# Patient Record
Sex: FEMALE | URBAN - METROPOLITAN AREA
[De-identification: names, ages, dates, MRNs, and addresses within clinical notes are randomized per-mention and may not be internally consistent; named-entity substitution may affect disease eponyms.]

---

## 2017-01-31 ENCOUNTER — HISTORICAL (OUTPATIENT)
Dept: ENDOSCOPY | Facility: HOSPITAL | Age: 67
End: 2017-01-31

## 2017-07-21 ENCOUNTER — HISTORICAL (OUTPATIENT)
Dept: ADMINISTRATIVE | Facility: HOSPITAL | Age: 67
End: 2017-07-21

## 2017-07-22 LAB — GRAM STN SPEC: NORMAL

## 2018-01-22 ENCOUNTER — HISTORICAL (OUTPATIENT)
Dept: LAB | Facility: HOSPITAL | Age: 68
End: 2018-01-22

## 2018-06-01 ENCOUNTER — HISTORICAL (OUTPATIENT)
Dept: LAB | Facility: HOSPITAL | Age: 68
End: 2018-06-01

## 2018-06-01 LAB
ALBUMIN SERPL-MCNC: 2.9 GM/DL (ref 3.4–5)
ALP SERPL-CCNC: 164 UNIT/L (ref 46–116)
ALT SERPL-CCNC: 31 UNIT/L (ref 12–78)
AST SERPL-CCNC: 31 UNIT/L (ref 15–37)
BILIRUB SERPL-MCNC: 0.8 MG/DL (ref 0.2–1)
BILIRUBIN DIRECT+TOT PNL SERPL-MCNC: 0.29 MG/DL (ref 0–0.2)
BILIRUBIN DIRECT+TOT PNL SERPL-MCNC: 0.51 MG/DL (ref 0–0.8)
BUN SERPL-MCNC: 38.4 MG/DL (ref 7–18)
CALCIUM SERPL-MCNC: 8.7 MG/DL (ref 8.5–10.1)
CHLORIDE SERPL-SCNC: 98 MMOL/L (ref 98–107)
CHOLEST SERPL-MCNC: 88 MG/DL (ref 0–200)
CHOLEST/HDLC SERPL: 1.9 {RATIO} (ref 0–4)
CO2 SERPL-SCNC: >45 MMOL/L (ref 21–32)
CREAT SERPL-MCNC: 1.19 MG/DL (ref 0.6–1.3)
CREAT/UREA NIT SERPL: 32
ERYTHROCYTE [DISTWIDTH] IN BLOOD BY AUTOMATED COUNT: 18.5 % (ref 11.5–17)
EST. AVERAGE GLUCOSE BLD GHB EST-MCNC: 140 MG/DL
FT4I SERPL CALC-MCNC: 3.81
GLUCOSE SERPL-MCNC: 200 MG/DL (ref 74–106)
HBA1C MFR BLD: 6.5 % (ref 4.5–6.2)
HCT VFR BLD AUTO: 24.7 % (ref 37–47)
HDLC SERPL-MCNC: 46 MG/DL (ref 40–60)
HGB BLD-MCNC: 7.4 GM/DL (ref 12–16)
LDLC SERPL CALC-MCNC: 33 MG/DL (ref 0–129)
MCH RBC QN AUTO: 23.3 PG (ref 27–31)
MCHC RBC AUTO-ENTMCNC: 30.1 GM/DL (ref 33–36)
MCV RBC AUTO: 77.5 FL (ref 80–94)
PLATELET # BLD AUTO: 207 X10(3)/MCL (ref 130–400)
PMV BLD AUTO: 7.6 FL (ref 7.4–10.4)
POTASSIUM SERPL-SCNC: 4.9 MMOL/L (ref 3.5–5.1)
PROT SERPL-MCNC: 7.7 GM/DL (ref 6.4–8.2)
RBC # BLD AUTO: 3.19 X10(6)/MCL (ref 4.2–5.4)
SODIUM SERPL-SCNC: 144 MMOL/L (ref 136–145)
T3RU NFR SERPL: 37 % (ref 31–39)
T4 SERPL-MCNC: 10.3 MCG/DL (ref 4.7–13.3)
TRIGL SERPL-MCNC: 44 MG/DL
TSH SERPL-ACNC: 2.63 MIU/ML (ref 0.36–3.74)
VLDLC SERPL CALC-MCNC: 9 MG/DL
WBC # SPEC AUTO: 6.8 X10(3)/MCL (ref 4.5–11.5)

## 2019-04-20 ENCOUNTER — HISTORICAL (OUTPATIENT)
Dept: LAB | Facility: HOSPITAL | Age: 69
End: 2019-04-20

## 2019-04-20 LAB
ABS NEUT (OLG): 4.74 X10(3)/MCL (ref 2.1–9.2)
ALBUMIN SERPL-MCNC: 3.1 GM/DL (ref 3.4–5)
ALP SERPL-CCNC: 163 UNIT/L (ref 46–116)
ALT SERPL-CCNC: 24 UNIT/L (ref 12–78)
AST SERPL-CCNC: 21 UNIT/L (ref 15–37)
BASOPHILS # BLD AUTO: 0 X10(3)/MCL (ref 0–0.2)
BASOPHILS NFR BLD AUTO: 0 %
BILIRUB SERPL-MCNC: 0.7 MG/DL (ref 0.2–1)
BILIRUBIN DIRECT+TOT PNL SERPL-MCNC: 0.16 MG/DL (ref 0–0.2)
BILIRUBIN DIRECT+TOT PNL SERPL-MCNC: 0.54 MG/DL (ref 0–0.8)
BUN SERPL-MCNC: 27 MG/DL (ref 7–18)
CALCIUM SERPL-MCNC: 9.6 MG/DL (ref 8.5–10.1)
CHLORIDE SERPL-SCNC: 93 MMOL/L (ref 98–107)
CHOLEST SERPL-MCNC: 121 MG/DL (ref 0–200)
CHOLEST/HDLC SERPL: 2.8 {RATIO} (ref 0–4)
CO2 SERPL-SCNC: >45 MMOL/L (ref 21–32)
CREAT SERPL-MCNC: 0.86 MG/DL (ref 0.6–1.3)
CREAT/UREA NIT SERPL: 31
DEPRECATED CALCIDIOL+CALCIFEROL SERPL-MC: 72.49 NG/ML (ref 30–80)
EOSINOPHIL # BLD AUTO: 0.3 X10(3)/MCL (ref 0–0.9)
EOSINOPHIL NFR BLD AUTO: 3 %
ERYTHROCYTE [DISTWIDTH] IN BLOOD BY AUTOMATED COUNT: 13.8 % (ref 11.5–17)
EST. AVERAGE GLUCOSE BLD GHB EST-MCNC: 200 MG/DL
FT4I SERPL CALC-MCNC: 2.64
GLUCOSE SERPL-MCNC: 272 MG/DL (ref 74–106)
HBA1C MFR BLD: 8.6 % (ref 4.5–6.2)
HCT VFR BLD AUTO: 35.9 % (ref 37–47)
HDLC SERPL-MCNC: 44 MG/DL (ref 40–60)
HGB BLD-MCNC: 10.9 GM/DL (ref 12–16)
IMM GRANULOCYTES # BLD AUTO: 0.02 % (ref 0–0.02)
IMM GRANULOCYTES NFR BLD AUTO: 0.3 % (ref 0–0.43)
LDLC SERPL CALC-MCNC: 58 MG/DL (ref 0–129)
LYMPHOCYTES # BLD AUTO: 1.9 X10(3)/MCL (ref 0.6–4.6)
LYMPHOCYTES NFR BLD AUTO: 25 %
MCH RBC QN AUTO: 26 PG (ref 27–31)
MCHC RBC AUTO-ENTMCNC: 30.4 GM/DL (ref 33–36)
MCV RBC AUTO: 85.5 FL (ref 80–94)
MONOCYTES # BLD AUTO: 0.8 X10(3)/MCL (ref 0.1–1.3)
MONOCYTES NFR BLD AUTO: 10 %
NEUTROPHILS # BLD AUTO: 4.74 X10(3)/MCL (ref 1.4–7.9)
NEUTROPHILS NFR BLD AUTO: 61 %
PLATELET # BLD AUTO: 209 X10(3)/MCL (ref 130–400)
PMV BLD AUTO: 10.4 FL (ref 9.4–12.4)
POTASSIUM SERPL-SCNC: 4.4 MMOL/L (ref 3.5–5.1)
PROT SERPL-MCNC: 8.4 GM/DL (ref 6.4–8.2)
RBC # BLD AUTO: 4.2 X10(6)/MCL (ref 4.2–5.4)
SODIUM SERPL-SCNC: 137 MMOL/L (ref 136–145)
T3RU NFR SERPL: 33 % (ref 31–39)
T4 SERPL-MCNC: 8 MCG/DL (ref 4.7–13.3)
TRIGL SERPL-MCNC: 95 MG/DL
TSH SERPL-ACNC: 2.77 MIU/ML (ref 0.36–3.74)
VLDLC SERPL CALC-MCNC: 19 MG/DL
WBC # SPEC AUTO: 7.7 X10(3)/MCL (ref 4.5–11.5)

## 2019-07-31 ENCOUNTER — HISTORICAL (OUTPATIENT)
Dept: EMERGENCY MEDICINE | Facility: HOSPITAL | Age: 69
End: 2019-07-31

## 2019-07-31 LAB
ABS NEUT (OLG): 5.11 X10(3)/MCL (ref 2.1–9.2)
APPEARANCE, UA: CLEAR
BACTERIA SPEC CULT: NORMAL
BASOPHILS # BLD AUTO: 0 X10(3)/MCL (ref 0–0.2)
BASOPHILS NFR BLD AUTO: 0 %
BILIRUB UR QL STRIP: NEGATIVE
BNP BLD-MCNC: 2660 PG/ML (ref 0–125)
BUN SERPL-MCNC: 28.4 MG/DL (ref 7–18)
CALCIUM SERPL-MCNC: 9.4 MG/DL (ref 8.5–10.1)
CHLORIDE SERPL-SCNC: 99 MMOL/L (ref 98–107)
CO2 SERPL-SCNC: >45 MMOL/L (ref 21–32)
COLOR UR: YELLOW
CREAT SERPL-MCNC: 0.84 MG/DL (ref 0.6–1.3)
CREAT/UREA NIT SERPL: 34
EOSINOPHIL # BLD AUTO: 0.1 X10(3)/MCL (ref 0–0.9)
EOSINOPHIL NFR BLD AUTO: 2 %
ERYTHROCYTE [DISTWIDTH] IN BLOOD BY AUTOMATED COUNT: 14.2 % (ref 11.5–17)
GLUCOSE (UA): NEGATIVE
GLUCOSE SERPL-MCNC: 39 MG/DL (ref 74–106)
HCO3 UR-SCNC: 49.2 MMOL/L (ref 22–26)
HCO3 UR-SCNC: 49.2 MMOL/L (ref 22–26)
HCT VFR BLD AUTO: 35 % (ref 37–47)
HGB BLD-MCNC: 9.7 GM/DL (ref 12–16)
HGB UR QL STRIP: NEGATIVE
IMM GRANULOCYTES # BLD AUTO: 0.06 % (ref 0–0.02)
IMM GRANULOCYTES NFR BLD AUTO: 0.7 % (ref 0–0.43)
KETONES UR QL STRIP: NEGATIVE
LEUKOCYTE ESTERASE UR QL STRIP: NEGATIVE
LYMPHOCYTES # BLD AUTO: 2.3 X10(3)/MCL (ref 0.6–4.6)
LYMPHOCYTES NFR BLD AUTO: 27 %
MAGNESIUM SERPL-MCNC: 2 MG/DL (ref 1.8–2.4)
MCH RBC QN AUTO: 26.4 PG (ref 27–31)
MCHC RBC AUTO-ENTMCNC: 27.7 GM/DL (ref 33–36)
MCV RBC AUTO: 95.1 FL (ref 80–94)
MONOCYTES # BLD AUTO: 0.9 X10(3)/MCL (ref 0.1–1.3)
MONOCYTES NFR BLD AUTO: 11 %
NEUTROPHILS # BLD AUTO: 5.11 X10(3)/MCL (ref 1.4–7.9)
NEUTROPHILS NFR BLD AUTO: 60 %
NITRITE UR QL STRIP: NEGATIVE
PCO2 BLDA: 116.2 MMHG (ref 35–45)
PCO2 BLDA: 116.2 MMHG (ref 35–45)
PH SMN: 7.24 [PH] (ref 7.35–7.45)
PH SMN: 7.24 [PH] (ref 7.35–7.45)
PH UR STRIP: 5.5 [PH] (ref 5–9)
PLATELET # BLD AUTO: 222 X10(3)/MCL (ref 130–400)
PMV BLD AUTO: 9.6 FL (ref 9.4–12.4)
PO2 BLDA: 105 MMHG (ref 80–100)
POC ALLENS TEST: ABNORMAL
POC BE: 22 (ref -2–3)
POC BE: 22 MMOL/L (ref -2–3)
POC CO2: >50 MMOL/L (ref 22–27)
POC SAMPLESOURCE: ABNORMAL
POC SATURATED O2: 96 % (ref 96–97)
POC SATURATED O2: 96 % (ref 96–97)
POC SITE: ABNORMAL
POC TCO2: >50 MMOL/L (ref 24–29)
POC TREATMENT: ABNORMAL
POC TROPONIN: 0.03 NG/ML (ref 0–0.08)
POTASSIUM SERPL-SCNC: 4.5 MMOL/L (ref 3.5–5.1)
PROT UR QL STRIP: NEGATIVE
RBC # BLD AUTO: 3.68 X10(6)/MCL (ref 4.2–5.4)
RBC #/AREA URNS HPF: NORMAL /[HPF]
SODIUM SERPL-SCNC: 145 MMOL/L (ref 136–145)
SP GR UR STRIP: 1.01 (ref 1–1.03)
SQUAMOUS EPITHELIAL, UA: NORMAL
TSH SERPL-ACNC: 2.2 MIU/ML (ref 0.36–3.74)
TSH SERPL-ACNC: 2.27 MIU/ML (ref 0.36–3.74)
UROBILINOGEN UR STRIP-ACNC: 0.2
WBC # SPEC AUTO: 8.5 X10(3)/MCL (ref 4.5–11.5)
WBC #/AREA URNS HPF: NORMAL /HPF

## 2019-08-05 LAB
ALBUMIN SERPL-MCNC: 2.3 GM/DL (ref 3.4–5)
BUN SERPL-MCNC: 31.7 MG/DL (ref 7–18)
CALCIUM SERPL-MCNC: 8.3 MG/DL (ref 8.5–10.1)
CHLORIDE SERPL-SCNC: 100 MMOL/L (ref 98–107)
CO2 SERPL-SCNC: 39.6 MMOL/L (ref 21–32)
CREAT SERPL-MCNC: 1.14 MG/DL (ref 0.6–1.3)
CREAT/UREA NIT SERPL: 28
GLUCOSE SERPL-MCNC: 165 MG/DL (ref 74–106)
HCO3 UR-SCNC: 38.3 MMOL/L (ref 22–26)
HCO3 UR-SCNC: 38.3 MMOL/L (ref 22–26)
PCO2 BLDA: 58.5 MMHG (ref 35–45)
PCO2 BLDA: 58.5 MMHG (ref 35–45)
PH SMN: 7.42 [PH] (ref 7.35–7.45)
PH SMN: 7.42 [PH] (ref 7.35–7.45)
PO2 BLDA: 203 MMHG (ref 80–100)
PO2 BLDA: 203 MMHG (ref 80–100)
POC ALLENS TEST: POSITIVE
POC BE: 14 (ref -2–3)
POC BE: 14 MMOL/L (ref -2–3)
POC CO2: 40 MMOL/L (ref 22–27)
POC SAMPLESOURCE: ABNORMAL
POC SATURATED O2: 100 % (ref 96–97)
POC SATURATED O2: >99 % (ref 96–97)
POC SITE: ABNORMAL
POC TCO2: 40 MMOL/L (ref 24–29)
POC TREATMENT: ABNORMAL
POTASSIUM SERPL-SCNC: 4.7 MMOL/L (ref 3.5–5.1)
PREALB SERPL-MCNC: 14.6 MG/DL (ref 18–35.7)
SODIUM SERPL-SCNC: 141 MMOL/L (ref 136–145)

## 2019-08-06 LAB
BUN SERPL-MCNC: 38.1 MG/DL (ref 7–18)
CALCIUM SERPL-MCNC: 9.8 MG/DL (ref 8.5–10.1)
CHLORIDE SERPL-SCNC: 96 MMOL/L (ref 98–107)
CO2 SERPL-SCNC: 40 MMOL/L (ref 21–32)
CREAT SERPL-MCNC: 1.12 MG/DL (ref 0.6–1.3)
CREAT/UREA NIT SERPL: 34
GLUCOSE SERPL-MCNC: 182 MG/DL (ref 74–106)
POTASSIUM SERPL-SCNC: 4.6 MMOL/L (ref 3.5–5.1)
SODIUM SERPL-SCNC: 137 MMOL/L (ref 136–145)

## 2019-08-07 LAB
ABS NEUT (OLG): 4.65 X10(3)/MCL (ref 2.1–9.2)
BASOPHILS # BLD AUTO: 0 X10(3)/MCL (ref 0–0.2)
BASOPHILS NFR BLD AUTO: 0 %
BUN SERPL-MCNC: 43.6 MG/DL (ref 7–18)
CALCIUM SERPL-MCNC: 9.4 MG/DL (ref 8.5–10.1)
CHLORIDE SERPL-SCNC: 96 MMOL/L (ref 98–107)
CO2 SERPL-SCNC: 42.2 MMOL/L (ref 21–32)
CREAT SERPL-MCNC: 1.4 MG/DL (ref 0.6–1.3)
CREAT/UREA NIT SERPL: 31
EOSINOPHIL # BLD AUTO: 0.3 X10(3)/MCL (ref 0–0.9)
EOSINOPHIL NFR BLD AUTO: 4 %
ERYTHROCYTE [DISTWIDTH] IN BLOOD BY AUTOMATED COUNT: 15.2 % (ref 11.5–17)
GLUCOSE SERPL-MCNC: 194 MG/DL (ref 74–106)
HCO3 UR-SCNC: 40.7 MMOL/L (ref 22–26)
HCO3 UR-SCNC: 40.7 MMOL/L (ref 22–26)
HCT VFR BLD AUTO: 31.5 % (ref 37–47)
HGB BLD-MCNC: 9.5 GM/DL (ref 12–16)
IMM GRANULOCYTES # BLD AUTO: 0.04 % (ref 0–0.02)
IMM GRANULOCYTES NFR BLD AUTO: 0.5 % (ref 0–0.43)
LYMPHOCYTES # BLD AUTO: 1.6 X10(3)/MCL (ref 0.6–4.6)
LYMPHOCYTES NFR BLD AUTO: 20 %
MCH RBC QN AUTO: 26.9 PG (ref 27–31)
MCHC RBC AUTO-ENTMCNC: 30.2 GM/DL (ref 33–36)
MCV RBC AUTO: 89.2 FL (ref 80–94)
MONOCYTES # BLD AUTO: 1.2 X10(3)/MCL (ref 0.1–1.3)
MONOCYTES NFR BLD AUTO: 16 %
NEUTROPHILS # BLD AUTO: 4.65 X10(3)/MCL (ref 1.4–7.9)
NEUTROPHILS NFR BLD AUTO: 60 %
PCO2 BLDA: 67.3 MMHG (ref 35–45)
PCO2 BLDA: 67.3 MMHG (ref 35–45)
PH SMN: 7.39 [PH] (ref 7.35–7.45)
PH SMN: 7.39 [PH] (ref 7.35–7.45)
PLATELET # BLD AUTO: 244 X10(3)/MCL (ref 130–400)
PMV BLD AUTO: 9.5 FL (ref 9.4–12.4)
PO2 BLDA: 89 MMHG (ref 80–100)
PO2 BLDA: 89 MMHG (ref 80–100)
POC ALLENS TEST: ABNORMAL
POC BE: 16 MMOL/L (ref -2–3)
POC CO2: 43 MMOL/L (ref 22–27)
POC SAMPLESOURCE: ABNORMAL
POC SATURATED O2: 96 % (ref 96–97)
POC SATURATED O2: 96 % (ref 96–97)
POC SITE: ABNORMAL
POC TCO2: 43 MMOL/L (ref 24–29)
POC TREATMENT: ABNORMAL
POTASSIUM SERPL-SCNC: 4.6 MMOL/L (ref 3.5–5.1)
RBC # BLD AUTO: 3.53 X10(6)/MCL (ref 4.2–5.4)
SODIUM SERPL-SCNC: 139 MMOL/L (ref 136–145)
WBC # SPEC AUTO: 7.7 X10(3)/MCL (ref 4.5–11.5)

## 2019-08-08 LAB
ABS NEUT (OLG): 5.22 X10(3)/MCL (ref 2.1–9.2)
ALBUMIN SERPL-MCNC: 3.1 GM/DL (ref 3.4–5)
ALBUMIN/GLOB SERPL: 0.7 RATIO (ref 1.1–2)
ALP SERPL-CCNC: 145 UNIT/L (ref 46–116)
ALT SERPL-CCNC: 23 UNIT/L (ref 12–78)
AST SERPL-CCNC: 21 UNIT/L (ref 15–37)
BASOPHILS # BLD AUTO: 0 X10(3)/MCL (ref 0–0.2)
BASOPHILS NFR BLD AUTO: 0 %
BILIRUB SERPL-MCNC: 0.4 MG/DL (ref 0.2–1)
BILIRUBIN DIRECT+TOT PNL SERPL-MCNC: 0.11 MG/DL (ref 0–0.2)
BILIRUBIN DIRECT+TOT PNL SERPL-MCNC: 0.29 MG/DL (ref 0–0.8)
BUN SERPL-MCNC: 60 MG/DL (ref 7–18)
CALCIUM SERPL-MCNC: 9.6 MG/DL (ref 8.5–10.1)
CHLORIDE SERPL-SCNC: 93 MMOL/L (ref 98–107)
CO2 SERPL-SCNC: 42.4 MMOL/L (ref 21–32)
CREAT SERPL-MCNC: 1.55 MG/DL (ref 0.6–1.3)
EOSINOPHIL # BLD AUTO: 0.2 X10(3)/MCL (ref 0–0.9)
EOSINOPHIL NFR BLD AUTO: 3 %
ERYTHROCYTE [DISTWIDTH] IN BLOOD BY AUTOMATED COUNT: 15.3 % (ref 11.5–17)
GLOBULIN SER-MCNC: 4.4 GM/DL (ref 2.4–3.5)
GLUCOSE SERPL-MCNC: 202 MG/DL (ref 74–106)
HCO3 UR-SCNC: 40.9 MMOL/L (ref 22–26)
HCO3 UR-SCNC: 40.9 MMOL/L (ref 22–26)
HCT VFR BLD AUTO: 33.5 % (ref 37–47)
HGB BLD-MCNC: 10.1 GM/DL (ref 12–16)
IMM GRANULOCYTES # BLD AUTO: 0.08 % (ref 0–0.02)
IMM GRANULOCYTES NFR BLD AUTO: 0.8 % (ref 0–0.43)
LYMPHOCYTES # BLD AUTO: 2.5 X10(3)/MCL (ref 0.6–4.6)
LYMPHOCYTES NFR BLD AUTO: 26 %
MAGNESIUM SERPL-MCNC: 2 MG/DL (ref 1.8–2.4)
MCH RBC QN AUTO: 26.5 PG (ref 27–31)
MCHC RBC AUTO-ENTMCNC: 30.1 GM/DL (ref 33–36)
MCV RBC AUTO: 87.9 FL (ref 80–94)
MONOCYTES # BLD AUTO: 1.5 X10(3)/MCL (ref 0.1–1.3)
MONOCYTES NFR BLD AUTO: 16 %
NEUTROPHILS # BLD AUTO: 5.22 X10(3)/MCL (ref 1.4–7.9)
NEUTROPHILS NFR BLD AUTO: 54 %
PCO2 BLDA: 65.7 MMHG (ref 35–45)
PCO2 BLDA: 65.7 MMHG (ref 35–45)
PH SMN: 7.4 [PH] (ref 7.35–7.45)
PH SMN: 7.4 [PH] (ref 7.35–7.45)
PLATELET # BLD AUTO: 305 X10(3)/MCL (ref 130–400)
PMV BLD AUTO: 10.1 FL (ref 9.4–12.4)
PO2 BLDA: 133 MMHG (ref 80–100)
PO2 BLDA: 133 MMHG (ref 80–100)
POC ALLENS TEST: ABNORMAL
POC BE: 16 MMOL/L (ref -2–3)
POC CO2: 43 MMOL/L (ref 22–27)
POC SAMPLESOURCE: ABNORMAL
POC SATURATED O2: 99 % (ref 96–97)
POC SATURATED O2: 99 % (ref 96–97)
POC TCO2: 43 MMOL/L (ref 24–29)
POC TREATMENT: ABNORMAL
POTASSIUM SERPL-SCNC: 4.1 MMOL/L (ref 3.5–5.1)
PROT SERPL-MCNC: 7.5 GM/DL (ref 6.4–8.2)
RBC # BLD AUTO: 3.81 X10(6)/MCL (ref 4.2–5.4)
SODIUM SERPL-SCNC: 139 MMOL/L (ref 136–145)
T4 FREE SERPL-MCNC: 1.62 NG/DL (ref 0.76–1.46)
TOBRAMYCIN TROUGH SERPL-MCNC: 1.3 MCG/ML (ref 0–2)
TSH SERPL-ACNC: 1.19 MIU/ML (ref 0.36–3.74)
WBC # SPEC AUTO: 9.6 X10(3)/MCL (ref 4.5–11.5)

## 2019-08-09 LAB
HCO3 UR-SCNC: 40.9 MMOL/L (ref 22–26)
PCO2 BLDA: 65.7 MMHG (ref 35–45)
PH SMN: 7.4 [PH] (ref 7.35–7.45)
PO2 BLDA: 133 MMHG (ref 50–100)
POC SATURATED O2: 99 % (ref 96–97)

## 2019-08-11 ENCOUNTER — HISTORICAL (OUTPATIENT)
Dept: ADMINISTRATIVE | Facility: HOSPITAL | Age: 69
End: 2019-08-11

## 2019-08-11 LAB
ABS NEUT (OLG): 9.17 X10(3)/MCL (ref 2.1–9.2)
APPEARANCE, UA: CLEAR
BACTERIA SPEC CULT: ABNORMAL
BASOPHILS # BLD AUTO: 0 X10(3)/MCL (ref 0–0.2)
BASOPHILS NFR BLD AUTO: 0 %
BILIRUB UR QL STRIP: NEGATIVE
BUN SERPL-MCNC: 77.8 MG/DL (ref 7–18)
CALCIUM SERPL-MCNC: 9 MG/DL (ref 8.5–10.1)
CHLORIDE SERPL-SCNC: 92 MMOL/L (ref 98–107)
CO2 SERPL-SCNC: 39.2 MMOL/L (ref 21–32)
COLOR UR: YELLOW
CREAT SERPL-MCNC: 1.48 MG/DL (ref 0.6–1.3)
CREAT/UREA NIT SERPL: 53
EOSINOPHIL # BLD AUTO: 0.1 X10(3)/MCL (ref 0–0.9)
EOSINOPHIL NFR BLD AUTO: 1 %
ERYTHROCYTE [DISTWIDTH] IN BLOOD BY AUTOMATED COUNT: 14.9 % (ref 11.5–17)
GLUCOSE (UA): NEGATIVE
GLUCOSE SERPL-MCNC: 181 MG/DL (ref 74–106)
HCT VFR BLD AUTO: 36.1 % (ref 37–47)
HGB BLD-MCNC: 11 GM/DL (ref 12–16)
HGB UR QL STRIP: ABNORMAL
IMM GRANULOCYTES # BLD AUTO: 0.04 % (ref 0–0.02)
IMM GRANULOCYTES NFR BLD AUTO: 0.3 % (ref 0–0.43)
KETONES UR QL STRIP: NEGATIVE
LEUKOCYTE ESTERASE UR QL STRIP: NEGATIVE
LYMPHOCYTES # BLD AUTO: 1.9 X10(3)/MCL (ref 0.6–4.6)
LYMPHOCYTES NFR BLD AUTO: 14 %
MAGNESIUM SERPL-MCNC: 2.4 MG/DL (ref 1.8–2.4)
MCH RBC QN AUTO: 26.7 PG (ref 27–31)
MCHC RBC AUTO-ENTMCNC: 30.5 GM/DL (ref 33–36)
MCV RBC AUTO: 87.6 FL (ref 80–94)
MONOCYTES # BLD AUTO: 1.8 X10(3)/MCL (ref 0.1–1.3)
MONOCYTES NFR BLD AUTO: 14 %
NEUTROPHILS # BLD AUTO: 9.17 X10(3)/MCL (ref 1.4–7.9)
NEUTROPHILS NFR BLD AUTO: 71 %
NITRITE UR QL STRIP: NEGATIVE
PH UR STRIP: 6 [PH] (ref 5–9)
PLATELET # BLD AUTO: 332 X10(3)/MCL (ref 130–400)
PMV BLD AUTO: 9.8 FL (ref 9.4–12.4)
POTASSIUM SERPL-SCNC: 3.6 MMOL/L (ref 3.5–5.1)
PROT UR QL STRIP: ABNORMAL
RBC # BLD AUTO: 4.12 X10(6)/MCL (ref 4.2–5.4)
RBC #/AREA URNS HPF: ABNORMAL /[HPF]
SODIUM SERPL-SCNC: 138 MMOL/L (ref 136–145)
SP GR UR STRIP: 1.01 (ref 1–1.03)
SQUAMOUS EPITHELIAL, UA: ABNORMAL
UROBILINOGEN UR STRIP-ACNC: 0.2
WBC # SPEC AUTO: 13 X10(3)/MCL (ref 4.5–11.5)
WBC #/AREA URNS HPF: ABNORMAL /[HPF]

## 2019-08-12 LAB
ABS NEUT (OLG): 6.25 X10(3)/MCL (ref 2.1–9.2)
ALBUMIN SERPL-MCNC: 3 GM/DL (ref 3.4–5)
BASOPHILS # BLD AUTO: 0 X10(3)/MCL (ref 0–0.2)
BASOPHILS NFR BLD AUTO: 0 %
BNP BLD-MCNC: 3099 PG/ML (ref 0–125)
BUN SERPL-MCNC: 71.5 MG/DL (ref 7–18)
CALCIUM SERPL-MCNC: 8.9 MG/DL (ref 8.5–10.1)
CHLORIDE SERPL-SCNC: 98 MMOL/L (ref 98–107)
CO2 SERPL-SCNC: 38.2 MMOL/L (ref 21–32)
COLOR STL: ABNORMAL
CONSISTENCY STL: ABNORMAL
CREAT SERPL-MCNC: 1.14 MG/DL (ref 0.6–1.3)
EOSINOPHIL # BLD AUTO: 0.2 X10(3)/MCL (ref 0–0.9)
EOSINOPHIL NFR BLD AUTO: 2 %
ERYTHROCYTE [DISTWIDTH] IN BLOOD BY AUTOMATED COUNT: 15.3 % (ref 11.5–17)
GLUCOSE SERPL-MCNC: 135 MG/DL (ref 74–106)
HCT VFR BLD AUTO: 34.6 % (ref 37–47)
HEMOCCULT SP1 STL QL: POSITIVE
HGB BLD-MCNC: 10.4 GM/DL (ref 12–16)
IMM GRANULOCYTES # BLD AUTO: 0.02 % (ref 0–0.02)
IMM GRANULOCYTES NFR BLD AUTO: 0.2 % (ref 0–0.43)
LYMPHOCYTES # BLD AUTO: 1.9 X10(3)/MCL (ref 0.6–4.6)
LYMPHOCYTES NFR BLD AUTO: 19 %
MAGNESIUM SERPL-MCNC: 2.4 MG/DL (ref 1.8–2.4)
MCH RBC QN AUTO: 26.7 PG (ref 27–31)
MCHC RBC AUTO-ENTMCNC: 30.1 GM/DL (ref 33–36)
MCV RBC AUTO: 88.7 FL (ref 80–94)
MONOCYTES # BLD AUTO: 1.4 X10(3)/MCL (ref 0.1–1.3)
MONOCYTES NFR BLD AUTO: 14 %
NEUTROPHILS # BLD AUTO: 6.25 X10(3)/MCL (ref 1.4–7.9)
NEUTROPHILS NFR BLD AUTO: 64 %
PHOSPHATE SERPL-MCNC: 3.9 MG/DL (ref 2.5–4.9)
PHOSPHATE SERPL-MCNC: 3.9 MG/DL (ref 2.5–4.9)
PLATELET # BLD AUTO: 294 X10(3)/MCL (ref 130–400)
PMV BLD AUTO: 10.1 FL (ref 9.4–12.4)
POTASSIUM SERPL-SCNC: 3.7 MMOL/L (ref 3.5–5.1)
RBC # BLD AUTO: 3.9 X10(6)/MCL (ref 4.2–5.4)
SODIUM SERPL-SCNC: 140 MMOL/L (ref 136–145)
TOBRAMYCIN TROUGH SERPL-MCNC: 0.9 MCG/ML (ref 0–2)
WBC # SPEC AUTO: 9.7 X10(3)/MCL (ref 4.5–11.5)

## 2019-08-13 LAB
ABS NEUT (OLG): 4.38 X10(3)/MCL (ref 2.1–9.2)
BASOPHILS # BLD AUTO: 0 X10(3)/MCL (ref 0–0.2)
BASOPHILS NFR BLD AUTO: 0 %
BUN SERPL-MCNC: 65.4 MG/DL (ref 7–18)
CALCIUM SERPL-MCNC: 9 MG/DL (ref 8.5–10.1)
CHLORIDE SERPL-SCNC: 98 MMOL/L (ref 98–107)
CO2 SERPL-SCNC: 31.2 MMOL/L (ref 21–32)
CREAT SERPL-MCNC: 1.26 MG/DL (ref 0.6–1.3)
CREAT/UREA NIT SERPL: 52
EOSINOPHIL # BLD AUTO: 0.2 X10(3)/MCL (ref 0–0.9)
EOSINOPHIL NFR BLD AUTO: 2 %
ERYTHROCYTE [DISTWIDTH] IN BLOOD BY AUTOMATED COUNT: 15.4 % (ref 11.5–17)
GLUCOSE SERPL-MCNC: 183 MG/DL (ref 74–106)
HCT VFR BLD AUTO: 31.3 % (ref 37–47)
HGB BLD-MCNC: 9.7 GM/DL (ref 12–16)
IMM GRANULOCYTES # BLD AUTO: 0.02 % (ref 0–0.02)
IMM GRANULOCYTES NFR BLD AUTO: 0.3 % (ref 0–0.43)
LYMPHOCYTES # BLD AUTO: 2.1 X10(3)/MCL (ref 0.6–4.6)
LYMPHOCYTES NFR BLD AUTO: 27 %
MCH RBC QN AUTO: 26.4 PG (ref 27–31)
MCHC RBC AUTO-ENTMCNC: 31 GM/DL (ref 33–36)
MCV RBC AUTO: 85.3 FL (ref 80–94)
MONOCYTES # BLD AUTO: 1.2 X10(3)/MCL (ref 0.1–1.3)
MONOCYTES NFR BLD AUTO: 15 %
NEUTROPHILS # BLD AUTO: 4.38 X10(3)/MCL (ref 1.4–7.9)
NEUTROPHILS NFR BLD AUTO: 56 %
PLATELET # BLD AUTO: 276 X10(3)/MCL (ref 130–400)
PMV BLD AUTO: 10.1 FL (ref 9.4–12.4)
POTASSIUM SERPL-SCNC: 4 MMOL/L (ref 3.5–5.1)
RBC # BLD AUTO: 3.67 X10(6)/MCL (ref 4.2–5.4)
SODIUM SERPL-SCNC: 140 MMOL/L (ref 136–145)
WBC # SPEC AUTO: 7.8 X10(3)/MCL (ref 4.5–11.5)

## 2019-08-14 LAB
FINAL CULTURE: NO GROWTH
TOBRAMYCIN TROUGH SERPL-MCNC: 0.4 MCG/ML (ref 0–2)

## 2019-08-16 LAB
BUN SERPL-MCNC: 43 MG/DL (ref 7–18)
CALCIUM SERPL-MCNC: 8.9 MG/DL (ref 8.5–10.1)
CHLORIDE SERPL-SCNC: 104 MMOL/L (ref 98–107)
CO2 SERPL-SCNC: 28.8 MMOL/L (ref 21–32)
CREAT SERPL-MCNC: 1.06 MG/DL (ref 0.6–1.3)
CREAT/UREA NIT SERPL: 41
GLUCOSE SERPL-MCNC: 144 MG/DL (ref 74–106)
POTASSIUM SERPL-SCNC: 5 MMOL/L (ref 3.5–5.1)
SODIUM SERPL-SCNC: 138 MMOL/L (ref 136–145)

## 2019-08-17 LAB
COLOR STL: NORMAL
CONSISTENCY STL: NORMAL
FERRITIN SERPL-MCNC: 266 NG/ML (ref 8–388)
HEMOCCULT SP2 STL QL: NEGATIVE
IRON SATN MFR SERPL: 31.8 % (ref 20–50)
IRON SERPL-MCNC: 57 MCG/DL (ref 50–175)
TIBC SERPL-MCNC: 179 MCG/DL (ref 250–450)
TRANSFERRIN SERPL-MCNC: 149 MG/DL (ref 200–360)

## 2019-08-19 LAB
BUN SERPL-MCNC: 46.5 MG/DL (ref 7–18)
CALCIUM SERPL-MCNC: 8.7 MG/DL (ref 8.5–10.1)
CHLORIDE SERPL-SCNC: 106 MMOL/L (ref 98–107)
CO2 SERPL-SCNC: 27.9 MMOL/L (ref 21–32)
COLOR STL: NORMAL
CONSISTENCY STL: NORMAL
CREAT SERPL-MCNC: 1.08 MG/DL (ref 0.6–1.3)
CREAT/UREA NIT SERPL: 43
GLUCOSE SERPL-MCNC: 109 MG/DL (ref 74–106)
POTASSIUM SERPL-SCNC: 5.1 MMOL/L (ref 3.5–5.1)
SODIUM SERPL-SCNC: 142 MMOL/L (ref 136–145)

## 2019-08-22 LAB
ABS NEUT (OLG): 7.47 X10(3)/MCL (ref 2.1–9.2)
BASOPHILS # BLD AUTO: 0 X10(3)/MCL (ref 0–0.2)
BASOPHILS NFR BLD AUTO: 0 %
BUN SERPL-MCNC: 31.7 MG/DL (ref 7–18)
CALCIUM SERPL-MCNC: 8.5 MG/DL (ref 8.5–10.1)
CHLORIDE SERPL-SCNC: 104 MMOL/L (ref 98–107)
CO2 SERPL-SCNC: 26.2 MMOL/L (ref 21–32)
CREAT SERPL-MCNC: 0.97 MG/DL (ref 0.6–1.3)
CREAT/UREA NIT SERPL: 33
EOSINOPHIL # BLD AUTO: 0.1 X10(3)/MCL (ref 0–0.9)
EOSINOPHIL NFR BLD AUTO: 1 %
ERYTHROCYTE [DISTWIDTH] IN BLOOD BY AUTOMATED COUNT: 16 % (ref 11.5–17)
GLUCOSE SERPL-MCNC: 124 MG/DL (ref 74–106)
HCT VFR BLD AUTO: 29.8 % (ref 37–47)
HGB BLD-MCNC: 9.2 GM/DL (ref 12–16)
IMM GRANULOCYTES # BLD AUTO: 0.03 % (ref 0–0.02)
IMM GRANULOCYTES NFR BLD AUTO: 0.3 % (ref 0–0.43)
LYMPHOCYTES # BLD AUTO: 1.6 X10(3)/MCL (ref 0.6–4.6)
LYMPHOCYTES NFR BLD AUTO: 16 %
MCH RBC QN AUTO: 27.1 PG (ref 27–31)
MCHC RBC AUTO-ENTMCNC: 30.9 GM/DL (ref 33–36)
MCV RBC AUTO: 87.9 FL (ref 80–94)
MONOCYTES # BLD AUTO: 0.8 X10(3)/MCL (ref 0.1–1.3)
MONOCYTES NFR BLD AUTO: 8 %
NEUTROPHILS # BLD AUTO: 7.47 X10(3)/MCL (ref 1.4–7.9)
NEUTROPHILS NFR BLD AUTO: 75 %
PLATELET # BLD AUTO: 219 X10(3)/MCL (ref 130–400)
PMV BLD AUTO: 10 FL (ref 9.4–12.4)
POTASSIUM SERPL-SCNC: 4.8 MMOL/L (ref 3.5–5.1)
RBC # BLD AUTO: 3.39 X10(6)/MCL (ref 4.2–5.4)
SODIUM SERPL-SCNC: 138 MMOL/L (ref 136–145)
WBC # SPEC AUTO: 9.9 X10(3)/MCL (ref 4.5–11.5)

## 2019-09-13 LAB — IRON SERPL-MCNC: 21 MCG/DL (ref 50–175)

## 2019-09-17 LAB
ABS NEUT (OLG): 3.8 X10(3)/MCL (ref 2.1–9.2)
ALBUMIN SERPL-MCNC: 2.1 GM/DL (ref 3.4–5)
ALBUMIN/GLOB SERPL: 0.6 RATIO (ref 1.1–2)
ALP SERPL-CCNC: 78 UNIT/L (ref 46–116)
ALT SERPL-CCNC: 58 UNIT/L (ref 12–78)
AST SERPL-CCNC: 45 UNIT/L (ref 15–37)
BASOPHILS # BLD AUTO: 0 X10(3)/MCL (ref 0–0.2)
BASOPHILS NFR BLD AUTO: 0 %
BILIRUB SERPL-MCNC: 0.2 MG/DL (ref 0.2–1)
BILIRUBIN DIRECT+TOT PNL SERPL-MCNC: 0.07 MG/DL (ref 0–0.8)
BILIRUBIN DIRECT+TOT PNL SERPL-MCNC: 0.13 MG/DL (ref 0–0.2)
BUN SERPL-MCNC: 59 MG/DL (ref 7–18)
CALCIUM SERPL-MCNC: 8 MG/DL (ref 8.5–10.1)
CHLORIDE SERPL-SCNC: 101 MMOL/L (ref 98–107)
CO2 SERPL-SCNC: 29.7 MMOL/L (ref 21–32)
CREAT SERPL-MCNC: 0.99 MG/DL (ref 0.6–1.3)
EOSINOPHIL # BLD AUTO: 0.1 X10(3)/MCL (ref 0–0.9)
EOSINOPHIL NFR BLD AUTO: 2 %
ERYTHROCYTE [DISTWIDTH] IN BLOOD BY AUTOMATED COUNT: 15.9 % (ref 11.5–17)
GLOBULIN SER-MCNC: 3.3 GM/DL (ref 2.4–3.5)
GLUCOSE SERPL-MCNC: 220 MG/DL (ref 74–106)
HCT VFR BLD AUTO: 28.1 % (ref 37–47)
HGB BLD-MCNC: 8.5 GM/DL (ref 12–16)
IMM GRANULOCYTES # BLD AUTO: 0.26 % (ref 0–0.02)
IMM GRANULOCYTES NFR BLD AUTO: 4.7 % (ref 0–0.43)
LYMPHOCYTES # BLD AUTO: 0.6 X10(3)/MCL (ref 0.6–4.6)
LYMPHOCYTES NFR BLD AUTO: 12 %
MCH RBC QN AUTO: 27.2 PG (ref 27–31)
MCHC RBC AUTO-ENTMCNC: 30.2 GM/DL (ref 33–36)
MCV RBC AUTO: 90.1 FL (ref 80–94)
MONOCYTES # BLD AUTO: 0.7 X10(3)/MCL (ref 0.1–1.3)
MONOCYTES NFR BLD AUTO: 13 %
NEUTROPHILS # BLD AUTO: 3.8 X10(3)/MCL (ref 1.4–7.9)
NEUTROPHILS NFR BLD AUTO: 68 %
PLATELET # BLD AUTO: 226 X10(3)/MCL (ref 130–400)
PMV BLD AUTO: 9.7 FL (ref 9.4–12.4)
POTASSIUM SERPL-SCNC: 4.1 MMOL/L (ref 3.5–5.1)
PROT SERPL-MCNC: 5.4 GM/DL (ref 6.4–8.2)
RBC # BLD AUTO: 3.12 X10(6)/MCL (ref 4.2–5.4)
SODIUM SERPL-SCNC: 136 MMOL/L (ref 136–145)
WBC # SPEC AUTO: 5.6 X10(3)/MCL (ref 4.5–11.5)

## 2019-09-18 LAB
ABS NEUT (OLG): 3.16 X10(3)/MCL (ref 2.1–9.2)
BUN SERPL-MCNC: 56 MG/DL (ref 7–18)
CALCIUM SERPL-MCNC: 7.8 MG/DL (ref 8.5–10.1)
CHLORIDE SERPL-SCNC: 103 MMOL/L (ref 98–107)
CO2 SERPL-SCNC: 30.9 MMOL/L (ref 21–32)
CREAT SERPL-MCNC: 1.07 MG/DL (ref 0.6–1.3)
CREAT/UREA NIT SERPL: 52
EOSINOPHIL NFR BLD MANUAL: 4 % (ref 0–8)
ERYTHROCYTE [DISTWIDTH] IN BLOOD BY AUTOMATED COUNT: 16.2 % (ref 11.5–17)
GLUCOSE SERPL-MCNC: 191 MG/DL (ref 74–106)
HCT VFR BLD AUTO: 27 % (ref 37–47)
HGB BLD-MCNC: 8.1 GM/DL (ref 12–16)
LYMPHOCYTES NFR BLD MANUAL: 19 % (ref 13–40)
MCH RBC QN AUTO: 27.4 PG (ref 27–31)
MCHC RBC AUTO-ENTMCNC: 30 GM/DL (ref 33–36)
MCV RBC AUTO: 91.2 FL (ref 80–94)
MONOCYTES NFR BLD MANUAL: 14 % (ref 2–11)
NEUTROPHILS NFR BLD MANUAL: 63 % (ref 47–80)
PLATELET # BLD AUTO: 212 X10(3)/MCL (ref 130–400)
PLATELET # BLD EST: NORMAL 10*3/UL
PMV BLD AUTO: 10 FL (ref 9.4–12.4)
POTASSIUM SERPL-SCNC: 4.3 MMOL/L (ref 3.5–5.1)
RBC # BLD AUTO: 2.96 X10(6)/MCL (ref 4.2–5.4)
RBC MORPH BLD: NORMAL
SODIUM SERPL-SCNC: 137 MMOL/L (ref 136–145)
WBC # SPEC AUTO: 5.2 X10(3)/MCL (ref 4.5–11.5)

## 2019-09-19 ENCOUNTER — HISTORICAL (OUTPATIENT)
Dept: ADMINISTRATIVE | Facility: HOSPITAL | Age: 69
End: 2019-09-19

## 2019-09-19 LAB
ABS NEUT (OLG): 4.56 X10(3)/MCL (ref 2.1–9.2)
AMMONIA PLAS-MSCNC: 30.2 MCG/DL (ref 14–44.8)
BASOPHILS # BLD AUTO: 0 X10(3)/MCL (ref 0–0.2)
BASOPHILS NFR BLD AUTO: 0 %
EOSINOPHIL # BLD AUTO: 0.1 X10(3)/MCL (ref 0–0.9)
EOSINOPHIL NFR BLD AUTO: 1 %
ERYTHROCYTE [DISTWIDTH] IN BLOOD BY AUTOMATED COUNT: 16.6 % (ref 11.5–17)
HCO3 UR-SCNC: 26.7 MMOL/L (ref 22–26)
HCO3 UR-SCNC: 26.7 MMOL/L (ref 22–26)
HCT VFR BLD AUTO: 27.5 % (ref 37–47)
HGB BLD-MCNC: 8.3 GM/DL (ref 12–16)
LYMPHOCYTES # BLD AUTO: 0.9 X10(3)/MCL (ref 0.6–4.6)
LYMPHOCYTES NFR BLD AUTO: 14 %
MCH RBC QN AUTO: 27.5 PG (ref 27–31)
MCHC RBC AUTO-ENTMCNC: 30.2 GM/DL (ref 33–36)
MCV RBC AUTO: 91.1 FL (ref 80–94)
MONOCYTES # BLD AUTO: 0.7 X10(3)/MCL (ref 0.1–1.3)
MONOCYTES NFR BLD AUTO: 11 %
NEUTROPHILS # BLD AUTO: 4.56 X10(3)/MCL (ref 1.4–7.9)
NEUTROPHILS NFR BLD AUTO: 72 %
PCO2 BLDA: 67.2 MMHG (ref 35–45)
PCO2 BLDA: 67.2 MMHG (ref 35–45)
PH SMN: 7.21 [PH] (ref 7.35–7.45)
PH SMN: 7.21 [PH] (ref 7.35–7.45)
PLATELET # BLD AUTO: 226 X10(3)/MCL (ref 130–400)
PMV BLD AUTO: 10.3 FL (ref 9.4–12.4)
PO2 BLDA: 117 MMHG (ref 80–100)
PO2 BLDA: 117 MMHG (ref 80–100)
POC ALLENS TEST: POSITIVE
POC BE: -1 MMOL/L (ref -2–3)
POC BE: 1 (ref -2–3)
POC CO2: 29 MMOL/L (ref 22–27)
POC SAMPLESOURCE: ABNORMAL
POC SATURATED O2: 97 % (ref 96–97)
POC SATURATED O2: 97 % (ref 96–97)
POC SITE: ABNORMAL
POC TCO2: 29 MMOL/L (ref 24–29)
POC TREATMENT: ABNORMAL
RBC # BLD AUTO: 3.02 X10(6)/MCL (ref 4.2–5.4)
WBC # SPEC AUTO: 6.4 X10(3)/MCL (ref 4.5–11.5)

## 2019-09-20 LAB
% SATURATION: 30 % (ref 68–77)
BUN SERPL-MCNC: 54.4 MG/DL (ref 7–18)
CALCIUM SERPL-MCNC: 8.3 MG/DL (ref 8.5–10.1)
CHLORIDE SERPL-SCNC: 109 MMOL/L (ref 98–107)
CO2 SERPL-SCNC: 29 MMOL/L (ref 21–32)
CREAT SERPL-MCNC: 1.1 MG/DL (ref 0.6–1.3)
CREAT/UREA NIT SERPL: 49
D BASE VENOUS: 1 (ref -2–3)
GLUCOSE SERPL-MCNC: 137 MG/DL (ref 74–106)
HCO3 UR-SCNC: 26.8 MMOL/L (ref 22–26)
HCO3 VENOUS: 26.8 MEQ/L (ref 22–29)
PCO2 BLDA: 49.4 MMHG (ref 35–45)
PCO2 VENOUS: 49.4 MMHG (ref 42–51)
PH SMN: 7.34 [PH] (ref 7.35–7.45)
PH VENOUS: 7.34 (ref 7.32–7.43)
PO2 BLDA: 21 MMHG (ref 80–100)
PO2 VENOUS: 21 MMHG (ref 35–45)
POC BE: 1 MMOL/L (ref -2–3)
POC SATURATED O2: 30 % (ref 96–97)
POC TCO2: 28 MMOL/L (ref 24–29)
POTASSIUM SERPL-SCNC: 4.3 MMOL/L (ref 3.5–5.1)
SAMPLE VEN: ABNORMAL
SITE VEN: ABNORMAL
SODIUM SERPL-SCNC: 144 MMOL/L (ref 136–145)
TREATMENT VEN: ABNORMAL

## 2019-09-24 LAB
ABS NEUT (OLG): 5.88 X10(3)/MCL (ref 2.1–9.2)
BASOPHILS # BLD AUTO: 0 X10(3)/MCL (ref 0–0.2)
BASOPHILS NFR BLD AUTO: 0 %
BUN SERPL-MCNC: 41.6 MG/DL (ref 7–18)
CALCIUM SERPL-MCNC: 8.1 MG/DL (ref 8.5–10.1)
CHLORIDE SERPL-SCNC: 112 MMOL/L (ref 98–107)
CO2 SERPL-SCNC: 24.5 MMOL/L (ref 21–32)
CREAT SERPL-MCNC: 1.13 MG/DL (ref 0.6–1.3)
CREAT/UREA NIT SERPL: 37
EOSINOPHIL # BLD AUTO: 0.1 X10(3)/MCL (ref 0–0.9)
EOSINOPHIL NFR BLD AUTO: 1 %
ERYTHROCYTE [DISTWIDTH] IN BLOOD BY AUTOMATED COUNT: 17.2 % (ref 11.5–17)
GLUCOSE SERPL-MCNC: 185 MG/DL (ref 74–106)
GROUP & RH: NORMAL
HCT VFR BLD AUTO: 24 % (ref 37–47)
HGB BLD-MCNC: 7.1 GM/DL (ref 12–16)
IMM GRANULOCYTES # BLD AUTO: 0.02 % (ref 0–0.02)
IMM GRANULOCYTES NFR BLD AUTO: 0.3 % (ref 0–0.43)
LYMPHOCYTES # BLD AUTO: 0.7 X10(3)/MCL (ref 0.6–4.6)
LYMPHOCYTES NFR BLD AUTO: 10 %
MCH RBC QN AUTO: 27.6 PG (ref 27–31)
MCHC RBC AUTO-ENTMCNC: 29.6 GM/DL (ref 33–36)
MCV RBC AUTO: 93.4 FL (ref 80–94)
MONOCYTES # BLD AUTO: 0.5 X10(3)/MCL (ref 0.1–1.3)
MONOCYTES NFR BLD AUTO: 7 %
NEUTROPHILS # BLD AUTO: 5.88 X10(3)/MCL (ref 1.4–7.9)
NEUTROPHILS NFR BLD AUTO: 82 %
PLATELET # BLD AUTO: 334 X10(3)/MCL (ref 130–400)
PMV BLD AUTO: 10 FL (ref 9.4–12.4)
POTASSIUM SERPL-SCNC: 4.2 MMOL/L (ref 3.5–5.1)
PRODUCT READY: NORMAL
RBC # BLD AUTO: 2.57 X10(6)/MCL (ref 4.2–5.4)
SODIUM SERPL-SCNC: 144 MMOL/L (ref 136–145)
TRANSFUSION ORDER: NORMAL
WBC # SPEC AUTO: 7.2 X10(3)/MCL (ref 4.5–11.5)

## 2019-09-25 LAB
ABS NEUT (OLG): 6.37 X10(3)/MCL (ref 2.1–9.2)
ALBUMIN SERPL-MCNC: 2.1 GM/DL (ref 3.4–5)
BASOPHILS # BLD AUTO: 0 X10(3)/MCL (ref 0–0.2)
BASOPHILS NFR BLD AUTO: 0 %
BUN SERPL-MCNC: 37.5 MG/DL (ref 7–18)
CALCIUM SERPL-MCNC: 8.2 MG/DL (ref 8.5–10.1)
CHLORIDE SERPL-SCNC: 110 MMOL/L (ref 98–107)
CO2 SERPL-SCNC: 25.3 MMOL/L (ref 21–32)
CREAT SERPL-MCNC: 1.06 MG/DL (ref 0.6–1.3)
EOSINOPHIL # BLD AUTO: 0.3 X10(3)/MCL (ref 0–0.9)
EOSINOPHIL NFR BLD AUTO: 3 %
ERYTHROCYTE [DISTWIDTH] IN BLOOD BY AUTOMATED COUNT: 16.6 % (ref 11.5–17)
GLUCOSE SERPL-MCNC: 170 MG/DL (ref 74–106)
HCT VFR BLD AUTO: 33.9 % (ref 37–47)
HGB BLD-MCNC: 10.5 GM/DL (ref 12–16)
IMM GRANULOCYTES # BLD AUTO: 0.04 % (ref 0–0.02)
IMM GRANULOCYTES NFR BLD AUTO: 0.5 % (ref 0–0.43)
LYMPHOCYTES # BLD AUTO: 0.7 X10(3)/MCL (ref 0.6–4.6)
LYMPHOCYTES NFR BLD AUTO: 9 %
MCH RBC QN AUTO: 28.6 PG (ref 27–31)
MCHC RBC AUTO-ENTMCNC: 31 GM/DL (ref 33–36)
MCV RBC AUTO: 92.4 FL (ref 80–94)
MONOCYTES # BLD AUTO: 0.6 X10(3)/MCL (ref 0.1–1.3)
MONOCYTES NFR BLD AUTO: 8 %
NEUTROPHILS # BLD AUTO: 6.37 X10(3)/MCL (ref 1.4–7.9)
NEUTROPHILS NFR BLD AUTO: 79 %
PHOSPHATE SERPL-MCNC: 4.3 MG/DL (ref 2.5–4.9)
PLATELET # BLD AUTO: 352 X10(3)/MCL (ref 130–400)
PMV BLD AUTO: 10.2 FL (ref 9.4–12.4)
POTASSIUM SERPL-SCNC: 3.9 MMOL/L (ref 3.5–5.1)
RBC # BLD AUTO: 3.67 X10(6)/MCL (ref 4.2–5.4)
SODIUM SERPL-SCNC: 143 MMOL/L (ref 136–145)
WBC # SPEC AUTO: 8.1 X10(3)/MCL (ref 4.5–11.5)

## 2019-10-07 ENCOUNTER — HISTORICAL (OUTPATIENT)
Dept: LAB | Facility: HOSPITAL | Age: 69
End: 2019-10-07

## 2019-10-07 LAB
BUN SERPL-MCNC: 32.6 MG/DL (ref 7–18)
CALCIUM SERPL-MCNC: 9 MG/DL (ref 8.5–10.1)
CHLORIDE SERPL-SCNC: 105 MMOL/L (ref 98–107)
CO2 SERPL-SCNC: 34.6 MMOL/L (ref 21–32)
CREAT SERPL-MCNC: 1.02 MG/DL (ref 0.6–1.3)
CREAT/UREA NIT SERPL: 32
ERYTHROCYTE [DISTWIDTH] IN BLOOD BY AUTOMATED COUNT: 16 % (ref 11.5–17)
GLUCOSE SERPL-MCNC: 179 MG/DL (ref 74–106)
HCT VFR BLD AUTO: 43.3 % (ref 37–47)
HGB BLD-MCNC: 13.1 GM/DL (ref 12–16)
MCH RBC QN AUTO: 28.2 PG (ref 27–31)
MCHC RBC AUTO-ENTMCNC: 30.3 GM/DL (ref 33–36)
MCV RBC AUTO: 93.1 FL (ref 80–94)
PLATELET # BLD AUTO: 271 X10(3)/MCL (ref 130–400)
PMV BLD AUTO: 10.5 FL (ref 9.4–12.4)
POTASSIUM SERPL-SCNC: 4.5 MMOL/L (ref 3.5–5.1)
RBC # BLD AUTO: 4.65 X10(6)/MCL (ref 4.2–5.4)
SODIUM SERPL-SCNC: 140 MMOL/L (ref 136–145)
WBC # SPEC AUTO: 6.1 X10(3)/MCL (ref 4.5–11.5)

## 2019-12-28 ENCOUNTER — HISTORICAL (OUTPATIENT)
Dept: LAB | Facility: HOSPITAL | Age: 69
End: 2019-12-28

## 2019-12-28 LAB
ALBUMIN SERPL-MCNC: 3.3 GM/DL (ref 3.4–5)
ALP SERPL-CCNC: 149 UNIT/L (ref 46–116)
ALT SERPL-CCNC: 33 UNIT/L (ref 12–78)
AST SERPL-CCNC: 33 UNIT/L (ref 15–37)
BILIRUB SERPL-MCNC: 0.6 MG/DL (ref 0.2–1)
BILIRUBIN DIRECT+TOT PNL SERPL-MCNC: 0.17 MG/DL (ref 0–0.2)
BILIRUBIN DIRECT+TOT PNL SERPL-MCNC: 0.43 MG/DL (ref 0–0.8)
CHOLEST SERPL-MCNC: 112 MG/DL (ref 0–200)
CHOLEST/HDLC SERPL: 2.5 {RATIO} (ref 0–4)
HDLC SERPL-MCNC: 45 MG/DL (ref 40–60)
LDLC SERPL CALC-MCNC: 51 MG/DL (ref 0–129)
PROT SERPL-MCNC: 7.6 GM/DL (ref 6.4–8.2)
TRIGL SERPL-MCNC: 80 MG/DL
VLDLC SERPL CALC-MCNC: 16 MG/DL

## 2020-02-15 ENCOUNTER — HISTORICAL (OUTPATIENT)
Dept: LAB | Facility: HOSPITAL | Age: 70
End: 2020-02-15

## 2020-02-15 LAB
ABS NEUT (OLG): 3.19 X10(3)/MCL (ref 2.1–9.2)
ALBUMIN SERPL-MCNC: 3.4 GM/DL (ref 3.4–5)
ALP SERPL-CCNC: 142 UNIT/L (ref 38–126)
ALT SERPL-CCNC: 25 UNIT/L (ref 12–78)
AST SERPL-CCNC: 24 UNIT/L (ref 15–37)
BASOPHILS # BLD AUTO: 0 X10(3)/MCL (ref 0–0.2)
BASOPHILS NFR BLD AUTO: 0 %
BILIRUB SERPL-MCNC: 0.6 MG/DL (ref 0.2–1)
BILIRUBIN DIRECT+TOT PNL SERPL-MCNC: 0.22 MG/DL (ref 0–0.2)
BILIRUBIN DIRECT+TOT PNL SERPL-MCNC: 0.38 MG/DL (ref 0–0.8)
BUN SERPL-MCNC: 39.1 MG/DL (ref 7–18)
CALCIUM SERPL-MCNC: 9.7 MG/DL (ref 8.5–10.1)
CHLORIDE SERPL-SCNC: 100 MMOL/L (ref 98–107)
CHOLEST SERPL-MCNC: 95 MG/DL (ref 0–200)
CHOLEST/HDLC SERPL: 2.5 {RATIO} (ref 0–4)
CO2 SERPL-SCNC: 38 MMOL/L (ref 21–32)
CREAT SERPL-MCNC: 1.08 MG/DL (ref 0.6–1.3)
CREAT/UREA NIT SERPL: 36
DEPRECATED CALCIDIOL+CALCIFEROL SERPL-MC: 87.51 NG/ML (ref 30–80)
EOSINOPHIL # BLD AUTO: 0.1 X10(3)/MCL (ref 0–0.9)
EOSINOPHIL NFR BLD AUTO: 2 %
ERYTHROCYTE [DISTWIDTH] IN BLOOD BY AUTOMATED COUNT: 13.6 % (ref 11.5–17)
EST. AVERAGE GLUCOSE BLD GHB EST-MCNC: 134 MG/DL
FT4I SERPL CALC-MCNC: 3.68
GLUCOSE SERPL-MCNC: 95 MG/DL (ref 74–106)
HBA1C MFR BLD: 6.3 % (ref 4.5–6.2)
HCT VFR BLD AUTO: 37.5 % (ref 37–47)
HDLC SERPL-MCNC: 38 MG/DL (ref 40–60)
HGB BLD-MCNC: 11.4 GM/DL (ref 12–16)
LDLC SERPL CALC-MCNC: 41 MG/DL (ref 0–129)
LYMPHOCYTES # BLD AUTO: 1.8 X10(3)/MCL (ref 0.6–4.6)
LYMPHOCYTES NFR BLD AUTO: 31 %
MCH RBC QN AUTO: 27.1 PG (ref 27–31)
MCHC RBC AUTO-ENTMCNC: 30.4 GM/DL (ref 33–36)
MCV RBC AUTO: 89.1 FL (ref 80–94)
MONOCYTES # BLD AUTO: 0.6 X10(3)/MCL (ref 0.1–1.3)
MONOCYTES NFR BLD AUTO: 11 %
NEUTROPHILS # BLD AUTO: 3.19 X10(3)/MCL (ref 1.4–7.9)
NEUTROPHILS NFR BLD AUTO: 55 %
PLATELET # BLD AUTO: 225 X10(3)/MCL (ref 130–400)
PMV BLD AUTO: 10.4 FL (ref 9.4–12.4)
POTASSIUM SERPL-SCNC: 4.1 MMOL/L (ref 3.5–5.1)
PROT SERPL-MCNC: 7.5 GM/DL (ref 6.4–8.2)
RBC # BLD AUTO: 4.21 X10(6)/MCL (ref 4.2–5.4)
SODIUM SERPL-SCNC: 143 MMOL/L (ref 136–145)
T3RU NFR SERPL: 35 % (ref 31–39)
T4 SERPL-MCNC: 10.5 MCG/DL (ref 4.7–13.3)
TRIGL SERPL-MCNC: 79 MG/DL
TSH SERPL-ACNC: 1.53 MIU/ML (ref 0.36–3.74)
VLDLC SERPL CALC-MCNC: 16 MG/DL
WBC # SPEC AUTO: 5.8 X10(3)/MCL (ref 4.5–11.5)

## 2022-04-29 NOTE — CONSULTS
Patient:   Ashia Luu            MRN: [id number]            FIN: [id number]               Age:   69 years     Sex:  Female     :  1950   Associated Diagnoses:   Tracheostomy present; Shortness of breath; Pleural effusion; Hypothyroid; Chronic respiratory failure with hypercapnia; Atrial fibrillation   Author:   Chetan KENT, Munira      Consultation Information   Consultation:  Chronic hypercapnic respiratory failure, Geo Parham MD       Basic Information   Admit information:  Mrs. Luu was recently discharge, is seemed Ltach was the plan. She was readmitted with complaints of SOB and is hypercapnic. .    Source of history:  Self.    Present at bedside:  Medical personnel.    Referral source:  eGo Parham MD    History limitation:  Clinical condition.       Chief Complaint      History of Present Illness   Mrs. Luu has an extensive cardiopulmonary history. She was initially sent by Dr. Castellanos's office for evaluation of weakness, debility, reduced PO intake for over a week. She was then sent to the ER and was in rapid A.Fib. She also presented with hypoglycemia CBG in the 30s. She has a history of chronic hypercapnic respiratory failure and has a trach, but presented with a PCO2 of 116 and PH of 7.2 on admit. Family also reports malodorous sputum. At present, she is obtunded, but arousable when the nurse shook her. Nursing reports she was just alert and awake and eating and now she is napping, on 2 liters trach collar. Her most recent ABGS have came down to PC02 of 85 and Ph of 7.3. I have put her back on bipap immediately and will recheck ABGs. Her recent imaging shows a very small right pleural effusion. We have been consulted to help with her pulmonary care.     2019 At present, Mrs. Luu is doing much better. Her Co2 has came down and she is alert and awake with family at bedside. We have been re-consulted due to her switch from acute to swing.          Review of  Systems   Unable to obtain ROS: due to clinical condition.      Health Status   Allergies:    Allergic Reactions (All)  No Known Allergies,    Allergies (1) Active Reaction  No Known Allergies None Documented     Current medications:  (Selected)   Inpatient Medications  Ordered  Brovana: 2 mL, 15 mcg =, form: Soln-Inh, NEB, BID, first dose 08/04/19 12:41:00 CDT  Colace 100 mg oral capsule: 200 mg, form: Cap, Oral, BID, first dose 08/06/19 21:00:00 CDT  Dextrose 50% and Water  (50 mL vial/syringe): 25 mL, 12.5 gm =, form: Injection, IV Push, As Directed PRN for blood glucose, Infuse over: 5 minute(s), first dose 08/04/19 9:46:00 CDT, Unconscious patient: Repeat as ordered per protocol.  Dextrose 50% and Water  (50 mL vial/syringe): 25 mL, 12.5 gm =, form: Injection, IV Push, Once PRN for blood glucose, Infuse over: 5 minute(s), first dose 08/04/19 9:46:00 CDT, Unconscious patient: Look for other source of altered mental status.  Dextrose 50% and Water  (50 mL vial/syringe): 50 mL, 25 gm =, form: Injection, IV Push, As Directed PRN for blood glucose, Infuse over: 5 minute(s), first dose 08/04/19 9:46:00 CDT, Unconscious patient: Repeat as ordered per protocol.  Dextrose 50% in Water intravenous solution: 25 mL, 12.5 gm =, form: Injection, IV Push, As Directed PRN for blood glucose, Infuse over: 5 minute(s), first dose 08/04/19 9:46:00 CDT, Conscious patient.  Diflucan 100 mg oral tablet: 100 mg, form: Tab, Oral, Daily, Order duration: 14 day(s), first dose 08/05/19 9:00:00 CDT, stop date 08/19/19 8:59:00 CDT  Dulcolax Laxative 10 mg RECTAL suppository: 10 mg, form: Supp, MO (rectal), Daily PRN for constipation, first dose 08/05/19 11:21:00 CDT, Give 1 dose today (8/5/19)  Dulcolax Laxative 10 mg RECTAL suppository: 10 mg, form: Supp, MO (rectal), Every other day PRN for constipation, first dose 08/04/19 9:47:00 CDT  DuoNeb 0.5 mg-2.5 mg/3 mL inhalation solution: 3 mL, form: Soln, NEB, q4hr PRN for wheezing, first dose  08/04/19 12:41:00 CDT  Januvia: 50 mg, form: Tab, Oral, Daily, first dose 08/05/19 9:00:00 CDT  KCL 20 mEq Oral Tab: 20 mEq, form: Tab-ER, Oral, BID, first dose 08/04/19 21:00:00 CDT  Lantus 100 units/mL subcutaneous inj.: 10 units, form: Injection, Subcutaneous, Daily, first dose 08/06/19 9:00:00 CDT, Waste Code BK  Lasix 40 mg oral tablet: 40 mg, form: Tab, Oral, BID, first dose 08/04/19 18:00:00 CDT  Megace 40 mg/mL oral suspension: 400 mg, form: Susp, Oral, BID, first dose 08/04/19 21:00:00 CDT  Merrem: 1 gm, form: Injection, IV Piggyback, q12hr, Infuse over: 1 hr, first dose 08/07/19 19:00:00 CDT, stop date 08/14/19 23:59:00 CDT  MiraLax (polyethylene glycol 3350): 17 gm, form: Powder-Recon, Oral, BID, first dose 08/05/19 12:20:00 CDT  Normal Saline Flush 0.9% injectable solution: 10 mL, form: Injection, IV Push, As Directed PRN for other (see comment), first dose 08/04/19 9:46:00 CDT, Prior to administering IV meds through Midline.  Normal Saline Flush 0.9% injectable solution: 10 mL, form: Injection, IV Push, q12hr, first dose 08/04/19 10:00:00 CDT, Flush each Midline IV lumen following hospital policy for other flushing guidelines  Normal Saline Flush 0.9% injectable solution: 20 mL, form: Injection, IV Push, As Directed PRN for other (see comment), first dose 08/04/19 9:46:00 CDT, Following administrations of IV meds through Midline  Pantoprazole 40 mg ORAL EC-Tablet: 40 mg, form: Tab-EC, Oral, Daily, first dose 08/05/19 6:00:00 CDT  acetaZOLAMIDE: 250 mg, form: Tab, Oral, BID, first dose 08/04/19 21:00:00 CDT  acetylcysteine 20% inhalation solution: 200 mg, form: Soln, NEB, q4hr Resp PRN for shortness of breath or wheezing, first dose 08/04/19 12:41:00 CDT  apixaban: 5 mg, form: Tab, Oral, BID, first dose 08/06/19 21:00:00 CDT  atorvastatin 40 mg oral tablet: 40 mg, form: Tab, Oral, Daily, first dose 08/04/19 17:00:00 CDT  glucagon: 1 mg, form: Injection, IM, q10min PRN for blood glucose, first dose  08/04/19 9:46:00 CDT, Conscious Patient with NO IV access available and BG < 45 mg/dl.  glucagon: 1 mg, form: Injection, IM, q10min PRN for blood glucose, first dose 08/04/19 9:46:00 CDT, Unconscious patient: Patient with NO IV access available and BG < 70 mg/dl.  insulin lispro: 2-14 units, form: Injection, Subcutaneous, As Directed PRN for blood glucose, first dose 08/04/19 9:46:00 CDT  levothyroxine 75 mcg (0.075 mg) oral tablet: 0.025 mg, form: Tab, Oral, Daily, first dose 08/05/19 6:00:00 CDT  metOLazone 5 mg oral tablet: 5 mg, form: Tab, Oral, Daily, first dose 08/05/19 12:00:00 CDT  metoprolol tartrate 1 mg/mL injectable sol: 10 mg, form: Injection, IV Push, q6hr PRN for tachycardia, first dose 08/06/19 15:26:00 CDT  metoprolol tartrate 25 mg oral tab: 50 mg, form: Tab, Oral, BID, first dose 08/06/19 21:00:00 CDT  tiotropium: 2.5 mcg, form: Aerosol, INH, Daily, first dose 08/05/19 9:00:00 CDT  tobramycin 60 mg/mL inhalation solution: 300 mg, form: Soln-Inh, NEB, BID, first dose 08/04/19 21:00:00 CDT  zinc oxide 40% topical ointment: 1 madhav, form: Ointment, TOP, TID, first dose 08/04/19 12:49:00 CDT  Documented Medications  Documented  Brovana 15 mcg/2 mL inhalation solution: 15 mcg = 2 mL, NEB, BID, 0 Refill(s)  DuoNeb 0.5 mg-2.5 mg/3 mL inhalation solution: 3 mL, NEB, q4hr, PRN PRN wheezing, 0 Refill(s)  Eliquis 5 mg oral tablet: 5 mg = 1 tab(s), Oral, BID, 0 Refill(s)  Januvia 50 mg oral tablet: 50 mg = 1 tab(s), Oral, Daily, 0 Refill(s)  KCL 20 mEq Oral Tab: 20 mEq = 1 tab(s), Oral, BID, 0 Refill(s)  Lasix 40 mg oral tablet: 40 mg = 1 tab(s), Oral, BID, 0 Refill(s)  Megace 40 mg/mL oral suspension: 400 mg = 10 mL, Oral, BID, 0 Refill(s)  Norvasc 5 mg oral tablet: 5 mg = 1 tab(s), Oral, Daily, 0 Refill(s)  Pantoprazole 40 mg ORAL EC-Tablet: 40 mg = 1 tab(s), Oral, Daily, 0 Refill(s)  Spiriva Respimat 2.5 mcg/inh inhalation aerosol: INH, Daily, 0 Refill(s)  acetaZOLAMIDE 250 mg oral tablet: 250 mg = 1 tab(s),  Oral, BID, 0 Refill(s)  acetylcysteine 20% inhalation solution: 200 mg = 1 mL, NEB, q4hr Resp, PRN PRN shortness of breath or wheezing, 0 Refill(s)  atorvastatin 40 mg oral tablet: 40 mg = 1 tab(s), Oral, Daily, 0 Refill(s)  levothyroxine 75 mcg (0.075 mg) oral tablet: Oral, Daily, 0 Refill(s)  meropenem: 1 gm, IV Piggyback, q8hr, 0 Refill(s)  metoprolol tartrate 25 mg oral tab: 25 mg = 1 tab(s), Oral, BID, 0 Refill(s)  mupirocin 2% topical oint: 1 madhav, TOP, BID, 0 Refill(s)  senna 8.6 mg oral tablet: 17.2 mg = 2 tab(s), Oral, Once, PRN PRN constipation, 0 Refill(s)  tobramycin 60 mg/mL inhalation solution: 300 mg = 5 mL, NEB, BID, 0 Refill(s)  zinc oxide 40% topical ointment: 1 madhav, TOP, TID, 0 Refill(s),    Medications (35) Active  Scheduled: (21)  acetazolamide 250 mg Tab  250 mg 1 tab(s), Oral, BID  apixaban 5 mg Tab  5 mg 1 tab(s), Oral, BID  arformoterol 15 mcg/2 mL Sol UD  15 mcg 2 mL, NEB, BID  atorvastatin 40 mg Tab  40 mg 1 tab(s), Oral, Daily  docusate sodium 100 mg Cap UD  200 mg 2 cap(s), Oral, BID  fluconazole 100 mg Tab UD  100 mg 1 tab(s), Oral, Daily  furosemide 40 mg Tab UD  40 mg 1 tab(s), Oral, BID  insulin (LanTUS) glargine 100 u/ml Sol PEN  10 units 0.1 mL, Subcutaneous, Daily  levothyroxine 0.025 mg Tab UD  0.025 mg 1 tab(s), Oral, Daily  megestrol 40 mg/mL Mony UD (10mL)  400 mg 10 mL, Oral, BID  meropenem  1 gm 1 EA, IV Piggyback, q12hr  metolazone 5 mg Tab UD  5 mg 1 tab(s), Oral, Daily  metoprolol tart. 25 mg Tab UD  50 mg 2 tab(s), Oral, BID  pantoprazole 40 mg EC Tab  40 mg 1 tab(s), Oral, Daily  polyethylene glycol (Miralax 17 gm pkt)  17 gm 1 packet(s), Oral, BID  potassium chloride 20 mEq  ER Tab UD  20 mEq 1 tab(s), Oral, BID  sitagliptin 50 mg Tab  50 mg 1 tab(s), Oral, Daily  sodium chloride 0.9% Inj-10ml  10 mL, IV Push, q12hr  tiotropium 2.5 mcg/inh Aero - 10 inh pck  2.5 mcg 1 inh, INH, Daily  tobramycin 60 mg/ml Sol - 5 mL UD  300 mg 5 mL, NEB, BID  ZInc Oxide 40% (Desitin)  1  madhav, TOP, TID  Continuous: (0)  PRN: (14)  acetylcysteine 20% Sol 4 ml  200 mg 1 mL, NEB, q4hr Resp  albuterol-ipratropium 3mg-0.5 mg/3 mL Sol  3 mL, NEB, q4hr  bisacodyl 10 mg Sup  10 mg 1 supp, MD (rectal), Every other day  bisacodyl 10 mg Sup  10 mg 1 supp, MD (rectal), Daily  dextrose 50% abboj  12.5 gm 25 mL, IV Push, As Directed  dextrose 50% abboj  12.5 gm 25 mL, IV Push, Once  dextrose 50% abboj  12.5 gm 25 mL, IV Push, As Directed  dextrose 50% abboj  25 gm 50 mL, IV Push, As Directed  glucagon recombinant 1 mg Inj  1 mg 1 EA, IM, q10min  glucagon recombinant 1 mg Inj  1 mg 1 EA, IM, q10min  insulin (Humalog) lispro 100 u/ml Inj  2-14 units, Subcutaneous, As Directed  metoprolol 1 mg/ml Inj-5 ml  10 mg 10 mL, IV Push, q6hr  sodium chloride 0.9% Inj-10ml  10 mL, IV Push, As Directed  sodium chloride 0.9% Inj-10ml  20 mL, IV Push, As Directed     Problem list:    All Problems  Weakness - general / SNOMED CT 463106388 / Confirmed  Sepsis / SNOMED CT 279071762 / Confirmed  Respiratory failure, acute / SNOMED CT [id number] / Confirmed  Pseudomonas / SNOMED CT 65260701 / Confirmed  Pressure ulcer of buttock stage 2 / SNOMED CT 9734802583 / Confirmed  Pleural effusion / SNOMED CT [id number] / Confirmed  PAD (peripheral artery disease) / SNOMED CT [id number] / Confirmed  Osteoarthritis / SNOMED CT [id number] / Confirmed  Obesity / SNOMED CT 4647731143 / Probable  Obesity / SNOMED CT 3222319138 / Confirmed  Morbid obesity / SNOMED CT 705781020 / Confirmed  Hypothyroid / SNOMED CT 332247103 / Confirmed  Hypertension / SNOMED CT 96462923 / Confirmed  Hyperlipidemia / SNOMED CT 95227012 / Confirmed  HLD (hyperlipidemia) / SNOMED CT [id number] / Confirmed  Gastric ulcer / SNOMED CT 9145895670 / Confirmed  Diabetes / SNOMED CT [id number] / Confirmed  Cor pulmonale /  SNOMED CT 784506704 / Confirmed  Constipation / SNOMED CT 42149518 / Confirmed  CAD - Coronary artery disease / SNOMED CT 5575249770 / Confirmed  CAD (coronary atherosclerotic disease) / SNOMED CT [id number] / Confirmed  Atrial fibrillation and flutter / SNOMED CT 899350251 / Confirmed  Atrial fibrillation / SNOMED CT [id number] / Confirmed  Anemia of chronic illness / SNOMED CT [id number] / Confirmed  Acute disease or injury-related malnutrition / SNOMED CT 864240716962712 / Confirmed  Achalasia / SNOMED CT 00096392 / Confirmed  Resolved: Pressure ulcer stage 2 / SNOMED CT 7408132823  Resolved: Pressure ulcer stage 1 / SNOMED CT 0730201960,    Active Problems (26)  Achalasia   Acute disease or injury-related malnutrition   Anemia of chronic illness   Atrial fibrillation   Atrial fibrillation and flutter   CAD (coronary atherosclerotic disease)   CAD - Coronary artery disease   Constipation   Cor pulmonale   Diabetes   Gastric ulcer   HLD (hyperlipidemia)   Hyperlipidemia   Hypertension   Hypothyroid   Morbid obesity   Obesity   Obesity   Osteoarthritis   PAD (peripheral artery disease)   Pleural effusion   Pressure ulcer of buttock stage 2   Pseudomonas   Respiratory failure, acute   Sepsis   Weakness - general         Histories   Past Medical History:    Active  Hypertension (23764617)  Hyperlipidemia (12059389)  CAD - Coronary artery disease (2192241199)  Obesity (7185164822)  Hypothyroid (578531085)  Atrial fibrillation and flutter (098097080)   Family History:    Heart failure.  Father     Procedure history:    Esophagogastroduodenoscopy on 7/10/2018 at 68 Years.  Comments:  7/10/2018 12:44 Marti Steve RN  auto-populated from documented surgical case  Sclerotherapy on 7/10/2018 at 68 Years.  Comments:  7/10/2018 12:44 Marti Steve RN.  auto-populated from documented surgical case  Esophagogastroduodenoscopy on  7/6/2018 at 68 Years.  Comments:  7/6/2018 19:08 BINGT - Otto SMILEY, Munira SERRANO  auto-populated from documented surgical case  Biopsy Gastrointestinal on 7/6/2018 at 68 Years.  Comments:  7/6/2018 19:08 BINGT - Otto SMILEY, Munira SERRANO  auto-populated from documented surgical case  Bronchoscopy, Diagnostic on 5/23/2017 at 67 Years.  Comments:  5/23/2017 11:47 CDT - Aron RRT, Yadira L  auto-populated from documented surgical case  Bypass CABG (.) on 5/16/2017 at 67 Years.  Comments:  5/16/2017 10:22 CDT - Quentin SMILEY, Warner  auto-populated from documented surgical case  Esophageal Motility on 1/31/2017 at 67 Years.  Comments:  1/31/2017 10:01 MENDY - Hailey SMILEY, Ashley Velasquez  auto-populated from documented surgical case  tubal ligation on 1/31/1979 at 29 Years.  tonsillectomy.  hip surgery with screws.   Social History        Social & Psychosocial Habits    Alcohol  01/06/2018  Use: Never    Tobacco  01/06/2018  Use: Never smoker    07/31/2019  Use: Never (less than 100 in l    Patient Wants Consult For Cessation Counseling No    Abuse/Neglect  07/31/2019  SHX Any signs of abuse or neglect No  .        Physical Examination   Vital Signs   8/7/2019 7:24 CDT        Temperature Oral          36.7 DegC                             Temperature Oral (calculated)             98.06 DegF                             Peripheral Pulse Rate     107 bpm  HI                             SpO2                      99 %                             Systolic Blood Pressure   101 mmHg                             Diastolic Blood Pressure  63 mmHg                             Mean Arterial Pressure, Cuff              76 mmHg     Intake and Output   Fluid Balance Primitives   8/6/2019 19:00 CDT       Urine Count               3                             Stool Count               0        General:  No acute distress, arousable.    Eye:  Pupils are equal, round and reactive to light.    HENT:  Normal hearing.    Neck:  Normal.     Respiratory:  Respirations are non-labored, Symmetrical chest wall expansion, diminished and prolonged respiratory phase.    Cardiovascular:  irregular .    Gastrointestinal:  Non-distended.    Genitourinary:  No costovertebral angle tenderness.    Lymphatics:  No lymphadenopathy neck, axilla, groin.    Musculoskeletal:  Normal range of motion, generalized weakness.    Integumentary:  Normal, Warm.    Cognition and Speech:  arousable.       Health Maintenance      Health Maintenance     Pending (in the next year)        OverDue           Coronary Artery Disease Maintenance-Lipid Lowering Therapy due  and every            ADL Screening due  06/01/18  and every 1  year(s)           Aspirin Therapy for CVD Prevention due  06/01/18  and every 1  year(s)           Colorectal Screening ([facility]) due  07/10/18  and every 1  year(s)           Advance Directive due  01/01/19  and every 1  year(s)           Alcohol Misuse Screening due  01/01/19  and every 1  year(s)           Cognitive Screening due  01/01/19  and every 1  year(s)           Functional Assessment due  01/01/19  and every 1  year(s)           Geriatric Depression Screening due  01/01/19  and every 1  year(s)           Breast Cancer Screening ([facility]) due  01/31/19  and every 2  year(s)        Due            Bone Density Screening due  08/07/19  Variable frequency           Diabetes Maintenance-Eye Exam due  08/07/19  and every            Diabetes Maintenance-Foot Exam due  08/07/19  and every            Hypertension Management-Education due  08/07/19  and every 1  year(s)           Pneumococcal Vaccine due  08/07/19  Variable frequency           Pneumococcal Vaccine due  08/07/19  and every            Tetanus Vaccine due  08/07/19  and every 10  year(s)           Zoster Vaccine due  08/07/19  and every 100  year(s)        Due In Future            Fall Risk Assessment not due until  01/01/20  and every 1  year(s)           Obesity Screening  not due until  01/01/20  and every 1  year(s)           Diabetes Maintenance-Fasting Lipid Profile not due until  04/19/20  and every 1  year(s)           HF-LVEF not due until  07/17/20  and every 2  year(s)           Diabetes Maintenance-HgbA1c not due until  07/30/20  and every 1  year(s)           Coronary Artery Disease Maintenance-Electrocardiogram not due until  08/02/20  and every 1  year(s)           HF-Heart Failure Education not due until  08/04/20  and every 1  year(s)           Hypertension Management-BMP not due until  08/05/20  and every 1  year(s)           Hypertension Management-Blood Pressure not due until  08/06/20  and every 1  year(s)     Satisfied (in the past 1 year)        Satisfied            Blood Pressure Screening on  08/07/19.  Satisfied by Swathi Larios           Body Mass Index Check on  08/05/19.  Satisfied by Jin DOOLEY, ARMANDONRadha           Coronary Artery Disease Maintenance-BMP on  08/07/19.  Satisfied by Quiana Gomes           Coronary Artery Disease Maintenance-Lipid Lowering Therapy on  08/06/19.  Satisfied by Megha Sutherland RN           Coronary Artery Disease Maintenance-Electrocardiogram on  08/02/19.  Satisfied by Riya Nagel RN           Diabetes Maintenance-Fasting Lipid Profile on  04/20/19.  Satisfied by Epifanio Fuller.           Diabetes Maintenance-HgbA1c on  07/31/19.  Satisfied by Jojo Odom           Diabetes Screening on  08/07/19.  Satisfied by Quiana Gomes           Fall Risk Assessment on  08/07/19.  Satisfied by Megha Sutherland RN           Hypertension Management-Blood Pressure on  08/07/19.  Satisfied by Swathi Larios           Hypertension Management-BMP on  08/07/19.  Satisfied by Quiana Gomes           Lipid Screening on  04/20/19.  Satisfied by Epifanio Fuller           Obesity Screening on  08/07/19.  Satisfied by Ingrid Tompkins          Review / Management   Laboratory  Results   Today's Lab Results : PowerNote Discrete Results   8/7/2019 5:34 CDT        WBC                       7.7 x10(3)/mcL                             RBC                       3.53 x10(6)/mcL  LOW                             Hgb                       9.5 gm/dL  LOW                             Hct                       31.5 %  LOW                             Platelet                  244 x10(3)/mcL                             MCV                       89.2 fL                             MCH                       26.9 pg  LOW                             MCHC                      30.2 gm/dL  LOW                             RDW                       15.2 %                             MPV                       9.5 fL                             Abs Neut                  4.65 x10(3)/mcL                             Neutro Auto               60 %  NA                             Lymph Auto                20 %  NA                             Mono Auto                 16 %  NA                             Eos Auto                  4 %  NA                             Abs Eos                   0.3 x10(3)/mcL                             Basophil Auto             0 %  NA                             Abs Neutro                4.65 x10(3)/mcL                             Abs Lymph                 1.6 x10(3)/mcL                             Abs Mono                  1.2 x10(3)/mcL                             Abs Baso                  0.0 x10(3)/mcL                             IG%                       0.500 %  HI                             IG#                       0.0400 %  HI                             Sodium Lvl                139 mmol/L                             Potassium Lvl             4.6 mmol/L                             Chloride                  96 mmol/L  LOW                             CO2                       42.2 mmol/L  HI                             Calcium Lvl               9.4 mg/dL                              Glucose Lvl               194 mg/dL  HI                             BUN                       43.6 mg/dL  HI                             Creatinine                1.40 mg/dL  HI                             BUN/Creat Ratio           31  NA                             eGFR-AA                   48 mL/min/1.73 m2  NA                             eGFR-YOLIE                  39 mL/min/1.73 m2  NA        Radiology results   X-ray   Diagnostic Findings:    * Final Report *    Reason For Exam  Congestion    Radiology Report  HISTORY: Congestion.     EXAM: XR Chest 1 View.      PRIOR STUDY: Chest radiograph 7/21/2018.     FINDINGS: Radiographic examination of the chest in a single view  demonstrates unchanged position of the tracheostomy tube. The right  upper extremity PICC has been removed. The trace right effusion  remains. There is no pneumothorax. The cardiac silhouette is normal.  There is no acute osseous abnormality.     IMPRESSION: Unchanged trace right pleural effusion.       Signature Line  Electronically Signed By: Alok Vizcarra MD  Date/Time Signed: 07/31/2019 11:48      This document has an image         Result type: XR Chest 1 View  Result date: July 31, 2019 11:42 CDT  Result status: Auth (Verified)  Result title: XR Chest 1 View  Performed by: Alok Vizcarra MD on July 31, 2019 11:47 CDT  Verified by: Alok Vizcarra MD on July 31, 2019 11:48 CDT  Encounter info: [id number], [facility] Acute, Emergency, 7/31/2019 - 7/31/2019       .       Impression and Plan   Diagnosis     Tracheostomy present (SFB28-NB Z93.0).     Shortness of breath (SSL19-IZ R06.02).     Pleural effusion (PRE68-JL J90).     Hypothyroid (XHT69-GX E03.9).     Chronic respiratory failure with hypercapnia (JQZ49-YK J96.12).     Atrial fibrillation (FQL15-CB I48.91).     Course:  Mrs. Luu is doing better. We will order for a sniff test to assess for paralyzed diagprham which would indicate her need for NIV vs. bipap. This is a ventilation issue and  the bipap is not the best fit in this situation.     Sniff test.  Start NIV.  Continue  long and short acting bronchodilators.  Continue Leandro nebs until 8/18 for a total of 14 days coverage for pseudomonas.   Continue to diurese as tolerated.  Continue IV antibioitcs.  Keep I < O.s.         .    Orders     Orders   Respiratory Therapy:  Ventilator Initiate/Monitor Adult (Order): 8/7/2019 11:19 CDT, Start NIV., Constant Indicator.     Orders   Patient Care:  Please order a SNIFF test per radiology. (Order): 8/7/2019 11:20 CDT, Please order a SNIFF test per radiology..

## 2022-04-29 NOTE — H&P
Patient:   Ashia Castillo            MRN: [id number]            FIN: [id number]               Age:   69 years     Sex:  Female     :  1950   Associated Diagnoses:   None   Author:   Geo Parham MD      Chief Complaint   Hypercapnic respiratory failure      History of Present Illness   69-year-old morbidly obese female admitted with acute on chronic hypercapnic respiratory failure requiring BiPAP (with back-up rate due to sleep apnea overnight) through her trach.  Sputum positive for Pseudomonas.      Patient complains of increased sputum production today.  No increased shortness of breath.  Sputum has not changed in color and is not bloody.  She has gained about 1-1/2 kg of weight over the past 2 days.      Review of Systems   Unable to obtain as patient has non talking trach      Health Status   Allergies:    Allergic Reactions (Selected)  No Known Allergies,    Allergies (1) Active Reaction  No Known Allergies None Documented     Current medications:  (Selected)   Inpatient Medications  Ordered  Brovana: 2 mL, 15 mcg =, form: Soln-Inh, NEB, BID, first dose 19 12:41:00 CDT  Colace 100 mg oral capsule: 200 mg, form: Cap, Oral, BID, first dose 19 21:00:00 CDT  Dextrose 50% and Water  (50 mL vial/syringe): 25 mL, 12.5 gm =, form: Injection, IV Push, As Directed PRN for blood glucose, Infuse over: 5 minute(s), first dose 19 9:46:00 CDT, Unconscious patient: Repeat as ordered per protocol.  Dextrose 50% and Water  (50 mL vial/syringe): 25 mL, 12.5 gm =, form: Injection, IV Push, Once PRN for blood glucose, Infuse over: 5 minute(s), first dose 19 9:46:00 CDT, Unconscious patient: Look for other source of altered mental status.  Dextrose 50% and Water  (50 mL vial/syringe): 50 mL, 25 gm =, form: Injection, IV Push, As Directed PRN for blood glucose, Infuse over: 5 minute(s), first dose 19 9:46:00 CDT, Unconscious patient: Repeat as ordered per protocol.  Dextrose 50% in  Water intravenous solution: 25 mL, 12.5 gm =, form: Injection, IV Push, As Directed PRN for blood glucose, Infuse over: 5 minute(s), first dose 08/04/19 9:46:00 CDT, Conscious patient.  Diflucan 100 mg oral tablet: 100 mg, form: Tab, Oral, Daily, Order duration: 14 day(s), first dose 08/05/19 9:00:00 CDT, stop date 08/19/19 8:59:00 CDT  Dulcolax Laxative 10 mg RECTAL suppository: 10 mg, form: Supp, RI (rectal), Daily PRN for constipation, first dose 08/05/19 11:21:00 CDT, Give 1 dose today (8/5/19)  Dulcolax Laxative 10 mg RECTAL suppository: 10 mg, form: Supp, RI (rectal), Every other day PRN for constipation, first dose 08/04/19 9:47:00 CDT  DuoNeb 0.5 mg-2.5 mg/3 mL inhalation solution: 3 mL, form: Soln, NEB, q4hr PRN for wheezing, first dose 08/04/19 12:41:00 CDT  Flomax 0.4 mg oral capsule: 0.4 mg, form: Cap, Oral, BID, first dose 08/14/19 21:00:00 CDT  Januvia: 50 mg, form: Tab, Oral, Daily, first dose 08/05/19 9:00:00 CDT  KCL 20 mEq Oral Tab: 20 mEq, form: Tab-ER, Oral, BID, first dose 08/04/19 21:00:00 CDT  Lantus 100 units/mL subcutaneous inj.: 10 units, form: Injection, Subcutaneous, Daily, first dose 08/06/19 9:00:00 CDT, Waste Code BKC  Lasix: 40 mg, form: Injection, IV Push, Once, first dose 08/16/19 17:50:00 CDT, stop date 08/16/19 17:50:00 CDT  Lopressor 1 mg/mL injectable solution: 5 mg, form: Injection, IV, q2hr PRN for tachycardia, first dose 08/10/19 10:25:00 CDT, To be given as needed for HR sustained over 120bpm  MiraLax (polyethylene glycol 3350): 17 gm, form: Powder-Recon, Oral, BID PRN for constipation, first dose 08/11/19 9:41:00 CDT  Normal Saline Flush 0.9% injectable solution: 10 mL, form: Injection, IV Push, As Directed PRN for other (see comment), first dose 08/04/19 9:46:00 CDT, Prior to administering IV meds through Midline.  Normal Saline Flush 0.9% injectable solution: 10 mL, form: Injection, IV Push, q12hr, first dose 08/04/19 10:00:00 CDT, Flush each Midline IV lumen following  hospital policy for other flushing guidelines  Normal Saline Flush 0.9% injectable solution: 20 mL, form: Injection, IV Push, As Directed PRN for other (see comment), first dose 08/04/19 9:46:00 CDT, Following administrations of IV meds through Midline  Pantoprazole 40 mg ORAL EC-Tablet: 40 mg, form: Tab-EC, Oral, BID, first dose 08/12/19 21:00:00 CDT  Leandro 60 mg/mL inhalation solution: 300 mg, form: Soln-Inh, NEB, Daily, first dose 08/16/19 8:00:00 CDT, stop date 08/18/19 10:00:00 CDT  Tylenol: 650 mg, form: Tab, Oral, q4hr PRN for pain, first dose 08/09/19 23:44:00 CDT  acetylcysteine 20% inhalation solution: 200 mg, form: Soln, NEB, q4hr Resp PRN for shortness of breath or wheezing, first dose 08/04/19 12:41:00 CDT  apixaban: 5 mg, form: Tab, Oral, BID, first dose 08/06/19 21:00:00 CDT  atorvastatin 40 mg oral tablet: 40 mg, form: Tab, Oral, Daily, first dose 08/04/19 17:00:00 CDT  citalopram 20 mg oral tablet: 10 mg, form: Tab, Oral, Daily, first dose 08/09/19 16:52:00 CDT  glucagon: 1 mg, form: Injection, IM, q10min PRN for blood glucose, first dose 08/04/19 9:46:00 CDT, Conscious Patient with NO IV access available and BG < 45 mg/dl.  glucagon: 1 mg, form: Injection, IM, q10min PRN for blood glucose, first dose 08/04/19 9:46:00 CDT, Unconscious patient: Patient with NO IV access available and BG < 70 mg/dl.  insulin lispro: 2-14 units, form: Injection, Subcutaneous, As Directed PRN for blood glucose, first dose 08/04/19 9:46:00 CDT  megestrol 40 mg/mL oral suspension: 400 mg, form: Susp, Oral, BID, first dose 08/04/19 21:00:00 CDT  metoprolol tartrate 25 mg oral tab: 75 mg, form: Tab, Oral, BID, first dose 08/10/19 21:00:00 CDT  tiotropium: 2.5 mcg, form: Aerosol, INH, Daily, first dose 08/05/19 9:00:00 CDT  zinc oxide 40% topical ointment: 1 madhav, form: Ointment, TOP, TID, first dose 08/04/19 12:49:00 CDT  Suspended  Lasix 40 mg oral tablet: 40 mg, form: Tab, Oral, BID, first dose 08/04/19 18:00:00  CDT  acetaZOLAMIDE: 250 mg, form: Tab, Oral, BID, first dose 08/04/19 21:00:00 CDT  metOLazone 5 mg oral tablet: 5 mg, form: Tab, Oral, Daily, first dose 08/05/19 12:00:00 CDT  Documented Medications  Documented  Brovana 15 mcg/2 mL inhalation solution: 15 mcg = 2 mL, NEB, BID, 0 Refill(s)  DuoNeb 0.5 mg-2.5 mg/3 mL inhalation solution: 3 mL, NEB, q4hr, PRN PRN wheezing, 0 Refill(s)  Eliquis 5 mg oral tablet: 5 mg = 1 tab(s), Oral, BID, 0 Refill(s)  Januvia 50 mg oral tablet: 50 mg = 1 tab(s), Oral, Daily, 0 Refill(s)  KCL 20 mEq Oral Tab: 20 mEq = 1 tab(s), Oral, BID, 0 Refill(s)  Lasix 40 mg oral tablet: 40 mg = 1 tab(s), Oral, BID, 0 Refill(s)  Megace 40 mg/mL oral suspension: 400 mg = 10 mL, Oral, BID, 0 Refill(s)  Norvasc 5 mg oral tablet: 5 mg = 1 tab(s), Oral, Daily, 0 Refill(s)  Pantoprazole 40 mg ORAL EC-Tablet: 40 mg = 1 tab(s), Oral, Daily, 0 Refill(s)  Spiriva Respimat 2.5 mcg/inh inhalation aerosol: INH, Daily, 0 Refill(s)  acetaZOLAMIDE 250 mg oral tablet: 250 mg = 1 tab(s), Oral, BID, 0 Refill(s)  acetylcysteine 20% inhalation solution: 200 mg = 1 mL, NEB, q4hr Resp, PRN PRN shortness of breath or wheezing, 0 Refill(s)  atorvastatin 40 mg oral tablet: 40 mg = 1 tab(s), Oral, Daily, 0 Refill(s)  levothyroxine 75 mcg (0.075 mg) oral tablet: Oral, Daily, 0 Refill(s)  meropenem: 1 gm, IV Piggyback, q8hr, 0 Refill(s)  metoprolol tartrate 25 mg oral tab: 25 mg = 1 tab(s), Oral, BID, 0 Refill(s)  mupirocin 2% topical oint: 1 madhav, TOP, BID, 0 Refill(s)  senna 8.6 mg oral tablet: 17.2 mg = 2 tab(s), Oral, Once, PRN PRN constipation, 0 Refill(s)  tobramycin 60 mg/mL inhalation solution: 300 mg = 5 mL, NEB, BID, 0 Refill(s)  zinc oxide 40% topical ointment: 1 madhav, TOP, TID, 0 Refill(s)   Problem list:    All Problems  Morbid obesity / SNOMED CT 298456260 / Confirmed  Pressure ulcer of buttock stage 2 / SNOMED CT 3547524397 / Confirmed  Weakness - general / SNOMED CT 928451896 / Confirmed  Cor pulmonale / SNOMED  CT 497709748 / Confirmed  Constipation / SNOMED CT 98671321 / Confirmed  Pseudomonas / SNOMED CT 59266425 / Confirmed  Atrial fibrillation and flutter / SNOMED CT 369550942 / Confirmed  Hypothyroid / SNOMED CT 583734670 / Confirmed  Hyperlipidemia / SNOMED CT 83116224 / Confirmed  Obesity / SNOMED CT 1584999369 / Confirmed  CAD - Coronary artery disease / SNOMED CT 2449597343 / Confirmed  Hypertension / SNOMED CT 14197516 / Confirmed  Achalasia / SNOMED CT 90458991 / Confirmed  Gastric ulcer / SNOMED CT 2017516574 / Confirmed  Pleural effusion / SNOMED CT [id number] / Confirmed  Anemia of chronic illness / SNOMED CT [id number] / Confirmed  Atrial fibrillation / SNOMED CT [id number] / Confirmed  Sepsis / SNOMED CT 196584987 / Confirmed  Respiratory failure, acute / SNOMED CT [id number] / Confirmed  CAD (coronary atherosclerotic disease) / SNOMED CT [id number] / Confirmed  PAD (peripheral artery disease) / SNOMED CT [id number] / Confirmed  HLD (hyperlipidemia) / SNOMED CT [id number] / Confirmed  Osteoarthritis / SNOMED CT [id number] / Confirmed  Acute disease or injury-related malnutrition / SNOMED CT 154710310431956 / Confirmed  Diabetes / SNOMED CT [id number] / Confirmed  Obesity / SNOMED CT 9417113057 / Probable,    Active Problems (26)  Achalasia   Acute disease or injury-related malnutrition   Anemia of chronic illness   Atrial fibrillation   Atrial fibrillation and flutter   CAD (coronary atherosclerotic disease)   CAD - Coronary artery disease   Constipation   Cor pulmonale   Diabetes   Gastric ulcer   HLD (hyperlipidemia)   Hyperlipidemia   Hypertension   Hypothyroid   Morbid obesity   Obesity   Obesity   Osteoarthritis   PAD (peripheral artery disease)   Pleural effusion    Pressure ulcer of buttock stage 2   Pseudomonas   Respiratory failure, acute   Sepsis   Weakness - general         Physical Examination   Vital Signs   8/16/2019 14:12 CDT      Oxygen Therapy            Trach collar    8/16/2019 12:11 CDT      Oxygen Therapy            Trach collar    8/16/2019 11:59 CDT      24 HR Intake Totals       260 mL                             24 HR Output Totals       300 mL                             24 HR I&O Balance         -40 mL    8/16/2019 11:40 CDT      Oxygen Flow Rate          4 L/min    8/16/2019 11:00 CDT      Temperature Oral          37.0 DegC                             Temperature Oral (calculated)             98.60 DegF                             Peripheral Pulse Rate     86 bpm                             SpO2                      98 %                             Systolic Blood Pressure   105 mmHg                             Diastolic Blood Pressure  68 mmHg                             Mean Arterial Pressure, Cuff              80 mmHg    8/16/2019 10:12 CDT      Peripheral Pulse Rate     88 bpm                             Oxygen Therapy            Trach collar    8/16/2019 8:51 CDT       Peripheral Pulse Rate     88 bpm                             Respiratory Rate          20 br/min    8/16/2019 8:42 CDT       Oxygen Therapy            Trach collar    8/16/2019 8:00 CDT       Peripheral Pulse Rate     88 bpm                             Respiratory Rate          18 br/min    8/16/2019 7:59 CDT       24 HR Intake Totals       0 mL                             24 HR Output Totals       0 mL                             24 HR I&O Balance         0 mL    8/16/2019 7:00 CDT       Temperature Oral          36.6 DegC                             Temperature Oral (calculated)             97.88 DegF                             Peripheral Pulse Rate     84 bpm                             SpO2                      98 %                             Systolic Blood Pressure   101 mmHg                              Diastolic Blood Pressure  62 mmHg                             Mean Arterial Pressure, Cuff              75 mmHg    8/16/2019 4:00 CDT       Peripheral Pulse Rate     88 bpm                             FIO2                      30 %                             SpO2                      98 %    8/16/2019 2:00 CDT       Peripheral Pulse Rate     86 bpm                             FIO2                      30 %                             SpO2                      97 %    8/16/2019 0:00 CDT       Peripheral Pulse Rate     49 bpm  LOW                             FIO2                      30 %                             SpO2                      100 %                             Systolic Blood Pressure   94 mmHg                             Diastolic Blood Pressure  57 mmHg  LOW                             Mean Arterial Pressure, Cuff              69 mmHg    8/15/2019 23:27 CDT      Peripheral Pulse Rate     49 bpm  LOW                             SpO2                      100 %                             Systolic Blood Pressure   94 mmHg                             Diastolic Blood Pressure  57 mmHg  LOW                             Mean Arterial Pressure, Cuff              70 mmHg    8/15/2019 22:00 CDT      FIO2                      30 %    8/15/2019 21:00 CDT      Peripheral Pulse Rate     88 bpm                             Respiratory Rate          18 br/min    8/15/2019 20:58 CDT      Heart Rate Monitored      80 bpm                             Systolic Blood Pressure   114 mmHg                             Diastolic Blood Pressure  81 mmHg    8/15/2019 19:35 CDT      Temperature Oral          36.5 DegC                             Temperature Oral (calculated)             97.70 DegF                             Peripheral Pulse Rate     80 bpm                             SpO2                      100 %                             Systolic Blood Pressure   114 mmHg                              Diastolic Blood Pressure  81 mmHg                             Mean Arterial Pressure, Cuff              92 mmHg    8/15/2019 19:00 CDT      Oxygen Therapy            Trach collar    8/15/2019 16:14 CDT      SpO2                      99 %                             Oxygen Therapy            Trach collar    8/15/2019 16:00 CDT      Temperature Oral          36.7 DegC                             Temperature Oral (calculated)             98.06 DegF                             Peripheral Pulse Rate     70 bpm                             Systolic Blood Pressure   124 mmHg                             Diastolic Blood Pressure  83 mmHg    8/15/2019 13:00 CDT      Heart Rate Monitored      68 bpm    8/15/2019 12:00 CDT      Temperature Oral          36.8 DegC                             Temperature Oral (calculated)             98.24 DegF                             Peripheral Pulse Rate     67 bpm                             SpO2                      100 %                             Systolic Blood Pressure   129 mmHg                             Diastolic Blood Pressure  80 mmHg    8/15/2019 8:42 CDT       Oxygen Therapy            Trach collar    8/15/2019 8:00 CDT       Heart Rate Monitored      82 bpm    8/15/2019 7:00 CDT       Temperature Oral          36.6 DegC                             Temperature Oral (calculated)             97.88 DegF                             Peripheral Pulse Rate     61 bpm                             SpO2                      100 %                             Systolic Blood Pressure   121 mmHg                             Diastolic Blood Pressure  69 mmHg    8/15/2019 6:30 CDT       Peripheral Pulse Rate     77 bpm                             Peripheral Pulse Rate     77 bpm    8/15/2019 6:00 CDT       Peripheral Pulse Rate     76 bpm                             Respiratory Rate          20 br/min                             FIO2                      30 %                             SpO2                       96 %                             Oxygen Therapy            Trach collar    8/15/2019 5:00 CDT       FIO2                      30 %    8/15/2019 3:35 CDT       Peripheral Pulse Rate     67 bpm                             SpO2                      99 %                             Systolic Blood Pressure   116 mmHg                             Diastolic Blood Pressure  72 mmHg                             Mean Arterial Pressure, Cuff              87 mmHg    8/15/2019 3:00 CDT       FIO2                      30 %    8/15/2019 1:00 CDT       FIO2                      30 %        Vital Signs (last 24 hrs)_____  Last Charted___________  Temp Oral     37.0 DegC  (AUG 16 11:00)  Heart Rate Peripheral   86 bpm  (AUG 16 11:00)  Resp Rate         20 br/min  (AUG 16 08:51)  SBP      105 mmHg  (AUG 16 11:00)  DBP      68 mmHg  (AUG 16 11:00)  SpO2      98 %  (AUG 16 11:00)  Weight      72 kg  (AUG 16 12:47)  Height      154 cm  (AUG 16 12:47)  BMI      30.36  (AUG 16 12:47)       General: Morbid obesity  Neck: Tracheal site looks okay without active bleeding  Respiratory: Occasional scattered rhonchi but mostly clear to auscultation bilaterally  Cardiovascular: Irregularly irregular, 1+ pitting bilateral lower extremity edema  Gastrointestinal: soft, non-tender, non-distended with normal bowel sounds, without masses to palpation  Integumentary: Stage II sacral decubitus pressure ulcer present on admission.  Bi-temporal skin lesions likely due to cancer  Neurologic: cranial nerves grossly intact, no signs of peripheral neurological deficit  Psych: Unable to assess      Impression and Plan     Acute on chronic hypercapnic respiratory failure  -Exacerbated by Pseudomonas pulmonary infection  -Merrem x10 days with last dose on 8/14/2019  -Inhaled tobramycin with last dose on 8/18/2019.  -BiPAP via trach as needed with backup rate overnight as pt certainly has bad ISIDRA as well.    -Case management working on  trilogy  -Oxygen, duo nebs, Mucomyst  -Pulmonary consult completed and they have added Brovana and Spiriva  -As oxygen levels remained fairly stable we will change continuous pulse oximetry to checking just once per shift and PRN    Increased sputum production  -As patient has gained 1.5 kg and 48 hours there may be some pulmonary edema, I will give 40 mg IV Lasix x1  -Check chest x-ray    Urinary retention  -Benites catheter placed and Flomax twice daily started  -Can try to DC Benites early next week    Highly suspect obstructive sleep apnea and likely degree of pickwickian syndrome  -Would not recommend removing trach at this time  -Place back-up rate on BiPAP overnight.    Diabetes mellitus type 2   -Diet controlled at home  -Stable  -CBGs with insulin sliding scale  -A1C = 6.9    Constipation  -Improved    Cor pulmonale with fluid retention  -On diuretics  -Periodic chemistries to evaluate for electrolyte abnormalities    Atrial fibrillation / atrial flutter   -Metoprolol 50 mg twice daily  -IV Lopressor as needed tachycardia  -Continue Eliquis  -As rate has been stable for some time will discontinue telemetry monitoring    Generalized weakness and debilitated state  -PT and OT evaluation and treatment    Malnutrition  -Dietitian consult for improved assessment and nutritional recommendations  -On Megace    Essential hypertension  -Stable continue home medications    Small right pleural effusion  -Appears to be too small to be worth thoracentesis    Chronic anemia  -Stable    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 fib  CODE STATUS is full code

## 2022-04-29 NOTE — CONSULTS
Patient:   Ashia Castillo            MRN: [id number]            FIN: [id number]               Age:   69 years     Sex:  Female     :  1950   Associated Diagnoses:   None   Author:   Benedicto Jon NP      Basic Information   History limitation:  Clinical condition.       History of Present Illness   This is a 69-year-old female known to Dr. Gayle who presents with weakness and atrial for relation with rapid ventricular rate hypoglycemia as well as acute on chronic respiratory failure.  She has an extensive cardiopulmonary history.  She has documented evidence of an hypercapnic respiratory failure with PCO2 of 85 with pH of 7.3.  History has required BiPAP for relation support and does have a history of a tracheostomy.  [facility] is been consulted for cardiac evaluation regarding atrial fibrillation with rapid ventricular rate.      Review of Systems   Constitutional:  Weakness.    Eye:  Negative.    Ear/Nose/Mouth/Throat:  Negative.    Respiratory:  Shortness of breath.    Cardiovascular:  Tachycardia.    Gastrointestinal:  Negative.    Genitourinary:  Negative.    Hematology/Lymphatics   Musculoskeletal:  Negative.    Integumentary   Neurologic:  Negative.    Psychiatric:  Negative.    All other systems are negative      Health Status   Current medications:    Medications (35) Active  Scheduled: (21)  acetazolamide 250 mg Tab  250 mg 1 tab(s), Oral, BID  apixaban 5 mg Tab  5 mg 1 tab(s), Oral, BID  arformoterol 15 mcg/2 mL Sol UD  15 mcg 2 mL, NEB, BID  atorvastatin 40 mg Tab  40 mg 1 tab(s), Oral, Daily  docusate sodium 100 mg Cap UD  200 mg 2 cap(s), Oral, BID  fluconazole 100 mg Tab UD  100 mg 1 tab(s), Oral, Daily  furosemide 40 mg Tab UD  40 mg 1 tab(s), Oral, BID  insulin (LanTUS) glargine 100 u/ml Sol PEN  10 units 0.1 mL, Subcutaneous, Daily  levothyroxine 0.025 mg Tab UD  0.025 mg 1 tab(s), Oral, Daily  megestrol 40 mg/mL Mony UD (10mL)  400 mg 10 mL, Oral, BID  meropenem  1 gm 1 EA, IV  Piggyback, q12hr  metolazone 5 mg Tab UD  5 mg 1 tab(s), Oral, Daily  metoprolol tart. 25 mg Tab UD  50 mg 2 tab(s), Oral, BID  pantoprazole 40 mg EC Tab  40 mg 1 tab(s), Oral, Daily  polyethylene glycol (Miralax 17 gm pkt)  17 gm 1 packet(s), Oral, BID  potassium chloride 20 mEq  ER Tab UD  20 mEq 1 tab(s), Oral, BID  sitagliptin 50 mg Tab  50 mg 1 tab(s), Oral, Daily  sodium chloride 0.9% Inj-10ml  10 mL, IV Push, q12hr  tiotropium 2.5 mcg/inh Aero - 10 inh pck  2.5 mcg 1 inh, INH, Daily  tobramycin 60 mg/ml Sol - 5 mL UD  300 mg 5 mL, NEB, BID  ZInc Oxide 40% (Desitin)  1 madhav, TOP, TID  Continuous: (0)        Histories   Past Medical History:    Active  Hypertension (58064642)  Hyperlipidemia (47225168)  CAD - Coronary artery disease (5786532496)  Obesity (3860738940)  Hypothyroid (733818362)  Atrial fibrillation and flutter (541147403)   Family History:    Heart failure.  Father     Procedure history:    Esophagogastroduodenoscopy on 7/10/2018 at 68 Years.  Comments:  7/10/2018 12:44 Marti Steve RN  auto-populated from documented surgical case  Sclerotherapy on 7/10/2018 at 68 Years.  Comments:  7/10/2018 12:44 Marti Steve RN  auto-populated from documented surgical case  Esophagogastroduodenoscopy on 7/6/2018 at 68 Years.  Comments:  7/6/2018 19:08 Munira Gentile RN  auto-populated from documented surgical case  Biopsy Gastrointestinal on 7/6/2018 at 68 Years.  Comments:  7/6/2018 19:08 Munira Gentile RN  auto-populated from documented surgical case  PEG Tube Insertion Initial on 6/20/2017 at 67 Years.  Comments:  6/20/2017 15:47 ETHAN North RN, Michael FROST  auto-populated from documented surgical case  Tracheostomy on 6/5/2017 at 67 Years.  Comments:  6/5/2017 13:13 CDT - Andres SMILEY, Neo QUACH.  auto-populated from documented surgical case  Bronchoscopy, Diagnostic on 5/23/2017 at 67 Years.  Comments:  5/23/2017 11:47 BINGT - Aron RRT, Yadira VASQUEZ  auto-populated from  documented surgical case  Bypass CABG (.) on 5/16/2017 at 67 Years.  Comments:  5/16/2017 10:22 CDT - Quentin SMILEY, Warner  auto-populated from documented surgical case  Esophageal Motility on 1/31/2017 at 67 Years.  Comments:  1/31/2017 10:01 MENDY - Hailey SMILEY, Ashley Velasquez  auto-populated from documented surgical case  tubal ligation on 1/31/1979 at 29 Years.  tonsillectomy.  Laparoscopic biopsy of liver ([id number]).  Tracheostomy (49563231).  Thoracentesis with insertion of pleural tube ([id number]).  hip surgery with screws.   Social History        Social & Psychosocial Habits    Alcohol  01/06/2018  Use: Never    Tobacco  01/06/2018  Use: Never smoker    07/31/2019  Use: Never (less than 100 in l    Patient Wants Consult For Cessation Counseling No    Abuse/Neglect  07/31/2019  SHX Any signs of abuse or neglect No  .        Physical Examination   General:  Alert and oriented, No acute distress.    Respiratory:       Breath sounds: Crackles present, Diminished.    Cardiovascular:  Irregularly irregular rhythm, Tachycardia.       Vital Signs (last 24 hrs)_____  Last Charted___________  Temp Oral     36.8 DegC  (AUG 08 07:00)  Heart Rate Peripheral   H 119bpm  (AUG 08 07:00)  Resp Rate         16 br/min  (AUG 07 22:00)  SBP      101 mmHg  (AUG 08 09:34)  DBP      62 mmHg  (AUG 08 09:34)  SpO2      99 %  (AUG 08 07:00)  Weight      73.8 kg  (AUG 08 06:00)     Integumentary:  Dry.    Neurologic:  Alert, Oriented.       Review / Management   Results review:  All Results   8/8/2019 5:58 CDT        WBC                       9.6 x10(3)/mcL                             RBC                       3.81 x10(6)/mcL  LOW                             Hgb                       10.1 gm/dL  LOW                             Hct                       33.5 %  LOW                             Platelet                  305 x10(3)/mcL                             MCV                       87.9 fL                             MCH                        26.5 pg  LOW                             MCHC                      30.1 gm/dL  LOW                             RDW                       15.3 %                             MPV                       10.1 fL                             Abs Neut                  5.22 x10(3)/mcL                             Neutro Auto               54 %  NA                             Lymph Auto                26 %  NA                             Mono Auto                 16 %  NA                             Eos Auto                  3 %  NA                             Abs Eos                   0.2 x10(3)/mcL                             Basophil Auto             0 %  NA                             Abs Neutro                5.22 x10(3)/mcL                             Abs Lymph                 2.5 x10(3)/mcL                             Abs Mono                  1.5 x10(3)/mcL  HI                             Abs Baso                  0.0 x10(3)/mcL                             IG%                       0.800 %  HI                             IG#                       0.0800 %  HI                             Sodium Lvl                139 mmol/L                             Potassium Lvl             4.1 mmol/L                             Chloride                  93 mmol/L  LOW                             CO2                       42.4 mmol/L  HI                             Calcium Lvl               9.6 mg/dL                             Magnesium Lvl             2.0 mg/dL                             Glucose Lvl               202 mg/dL  HI                             BUN                       60.0 mg/dL  HI                             Creatinine                1.55 mg/dL  HI                             eGFR-AA                   42 mL/min/1.73 m2  NA                             eGFR-YOLIE                  35 mL/min/1.73 m2  NA                             Bili Total                0.4 mg/dL                             Bili Direct                0.11 mg/dL                             Bili Indirect             0.29 mg/dL                             AST                       21 unit/L                             ALT                       23 unit/L                             Alk Phos                  145 unit/L  HI                             Total Protein             7.5 gm/dL                             Albumin Lvl               3.10 gm/dL  LOW                             Globulin                  4.40 gm/dL  HI  .       Impression and Plan   AFIB with RVR   Hypercapnic Respiratory Failure    -Sputum positive for Pseudomonas   TOREY  CAD/CABG ×22017 LIMA/LAD SVG/ramus   ACS  HTN   HLP     PLAN:  Continue diuresis metolazone and by mouth Lasix  Check Free T4 and TSH  Continue Lopressor and utilize IV BB for HR sustain above 120bpm   Will allow for underlying issues to settle before aggresive management of HR   Will continue to follow and monitor on telemetry

## 2022-04-29 NOTE — ED PROVIDER NOTES
Patient:   Ashia Castillo            MRN: [id number]            FIN: [id number]               Age:   69 years     Sex:  Female     :  1950   Associated Diagnoses:   Weakness; Atrial fibrillation with RVR; Hypoglycemia; Acid-base imbalance; Acute on chronic respiratory failure   Author:   Carlito Ortiz MD      Basic Information   Time seen: Date & time 2019 10:22:00.   History source: Patient.   Arrival mode: Private vehicle.   History limitation: None.   Provider/Visit info:   Time Seen:  Carlito Ortiz MD / 2019 10:21  .   History of Present Illness   This 69-year-old female was referred from Dr. Collado's office where she had presented for routine follow-up.  Upon presentation she was found to have a pulse of 140 and a blood pressure of 100/60.  Family reports that about a month ago she was started on Lasix for fluid build-up and congestion.  They also report that she has been weak and fatigued, and she has difficulty feeding herself.       Review of Systems   Constitutional symptoms:  Weakness, fatigue.              Additional review of systems information: All other systems reviewed and otherwise negative.      Health Status   Allergies:    Allergic Reactions (Selected)  No Known Allergies.      Past Medical/ Family/ Social History   Medical history:    Active  Obesity ([id number])  Hypothyroid (736606222)  Hypertension (41295724)  Hyperlipidemia (41959417)  CAD - Coronary artery disease (0130059931)  Atrial fibrillation and flutter (050294280), Reviewed as documented in chart.   Surgical history:    Esophagogastroduodenoscopy on 7/10/2018 at 68 Years.  Comments:  7/10/2018 12:44 Marti Steve RN  auto-populated from documented surgical case  Sclerotherapy on 7/10/2018 at 68 Years.  Comments:  7/10/2018 12:44 Marti Steve RN  auto-populated from documented surgical case  Esophagogastroduodenoscopy on 2018 at 68 Years.  Comments:  2018 19:08 ETHAN Menjivar  BALJIT, Munira SERRANO  auto-populated from documented surgical case  Biopsy Gastrointestinal on 7/6/2018 at 68 Years.  Comments:  7/6/2018 19:08 CDT - Otto SMILEY, Munira SERRANO  auto-populated from documented surgical case  PEG Tube Insertion Initial on 6/20/2017 at 67 Years.  Comments:  6/20/2017 15:47 BINGT - Mary Anne SMILEY, Michael FROST  auto-populated from documented surgical case  Tracheostomy on 6/5/2017 at 67 Years.  Comments:  6/5/2017 13:13 BINGT - Andres SMILEY, Neo HUBBARD  auto-populated from documented surgical case  Bronchoscopy, Diagnostic on 5/23/2017 at 67 Years.  Comments:  5/23/2017 11:47 CDT - Aron RRT, Yadira VASQUEZ  auto-populated from documented surgical case  Bypass CABG (.) on 5/16/2017 at 67 Years.  Comments:  5/16/2017 10:22 ETHAN Saavedra RN, Warner  auto-populated from documented surgical case  Esophageal Motility on 1/31/2017 at 67 Years.  Comments:  1/31/2017 10:01 MENDY Hart RN, Ashley Velasquez  auto-populated from documented surgical case  tubal ligation on 1/31/1979 at 29 Years.  tonsillectomy.  Laparoscopic biopsy of liver ([id number]).  Tracheostomy (03814117).  Thoracentesis with insertion of pleural tube ([id number]).  hip surgery with screws., Reviewed as documented in chart.   Social history:    Social & Psychosocial Habits    Alcohol  01/06/2018  Use: Never    Tobacco  01/06/2018  Use: Never smoker    07/31/2019  Use: Never (less than 100 in l    Patient Wants Consult For Cessation Counseling No    Abuse/Neglect  07/31/2019  SHX Any signs of abuse or neglect No  , Alcohol use: Denies, Tobacco use: Denies, Drug use: Denies.      Physical Examination   General:  Alert, no acute distress.    Skin:  Warm, dry.    Head:  Normocephalic.   Neck:  Supple, trachea midline, no tenderness, no JVD, no carotid bruit, Tracheostomy in place.    Eye:  Pupils are equal, round and reactive to light, extraocular movements are intact.    Ears, nose, mouth and throat:  Tympanic membranes clear, oral mucosa moist, no  pharyngeal erythema or exudate.    Cardiovascular:  Regular rate and rhythm, No murmur, Normal peripheral perfusion, No edema.    Respiratory:  Lungs are clear to auscultation, respirations are non-labored, breath sounds are equal.    Gastrointestinal:  Soft, Nontender, Non distended, Normal bowel sounds, No organomegaly.    Back:  Nontender, Normal range of motion, Normal alignment.    Musculoskeletal:  Normal ROM, no tenderness, no swelling, no deformity.    Neurological:  Alert and oriented to person, place, time, and situation, No focal neurological deficit observed, CN II-XII intact, normal sensory observed, normal motor observed, normal speech observed, normal coordination observed.    Lymphatics:  No lymphadenopathy.   Psychiatric:  Cooperative, appropriate mood & affect, normal judgment.       Medical Decision Making   Differential Diagnosis:  Weakness, hypotension, dehydration, deconditioning.    Orders  Launch Orders   Laboratory:  Urinalysis Complete a reflex to culture (Order): Stat collect, Urine, 7/31/2019 10:31 CDT, Nurse collect, Print Label By Order Location  TSH (Order): Stat collect, 7/31/2019 10:31 CDT, Blood, Lab Collect, 7/31/2019 10:31 CDT  Magnesium Level (Order): Stat collect, 7/31/2019 10:31 CDT, Blood, Lab Collect, 7/31/2019 10:31 CDT  POC iSTAT ER Troponin request  [facility] (Order): Blood, Stat collect, 7/31/2019 10:30 CDT by Carlito Ortiz MD, Nurse collect  BMP (Order): Stat collect, 7/31/2019 10:30 CDT, Blood, Lab Collect, 7/31/2019 10:30 CDT  CBC w/ Auto Diff (Order): Now collect, 7/31/2019 10:30 CDT, Blood, Lab Collect, 7/31/2019 10:30 CDT  Cardiology:  EKG (Order): 7/31/2019 10:30 CDT, Stretcher, Patient Has IV, Patient on O2, Standard Precautions, -1, -1, 7/31/2019 10:30 CDT, Launch Orders   Laboratory:  TSH (Order): Stat collect, 7/31/2019 10:33 CDT, Blood, Lab Collect, 7/31/2019 10:33 CDT  BNP (Order): Stat collect, 7/31/2019 10:33 CDT, Blood, Lab Collect, 7/31/2019 10:33 CDT, Launch  Orders   Laboratory:  ABG Request POC (Order): Stat collect, Arterial Blood, 7/31/2019 11:31 CDT, Once, Stop date 7/31/2019 11:31 CDT, Print Label By Order Location  Patient Care:  Feed Patient (Order): 7/31/2019 11:31 CDT, Diabetic tray  Pharmacy:  [id number] abboject syringe (Order): 50 mL, form: Injection, IV Push, Once, first dose 7/31/2019 11:30 CDT, stop date 7/31/2019 11:30 CDT, Launch Orders   Radiology:  XR Chest 1 View (Order): Stat, 7/31/2019 11:32 CDT, Congestion, None, Stretcher, Patient Has IV?, Patient on Oxygen?, Rad Type, Not Scheduled.    Cardiac monitor:  Time 7/31/2019 11:31:00, Rate 97, Atrial fibrillation.    Electrocardiogram:  Time 7/31/2019 10:27:00, rate 118, The Rhythm is atrial fibrillation and rapid ventricular response.  .    Results review:  Lab results : Lab View   7/31/2019 10:44 CDT      Sodium Lvl                145 mmol/L                             Potassium Lvl             4.5 mmol/L                             Chloride                  99 mmol/L                             CO2                       >45.0 mmol/L  HI                             Calcium Lvl               9.4 mg/dL                             Magnesium Lvl             2.0 mg/dL                             Glucose Lvl               39 mg/dL  CRIT                             BUN                       28.4 mg/dL  HI                             Creatinine                0.84 mg/dL                             BUN/Creat Ratio           34  NA                             eGFR-AA                   >60 mL/min/1.73 m2  NA                             eGFR-YOLIE                  >60 mL/min/1.73 m2  NA                             TSH                       2.265 mIU/mL                             WBC                       8.5 x10(3)/mcL                             RBC                       3.68 x10(6)/mcL  LOW                             Hgb                       9.7 gm/dL  LOW                             Hct                       35.0  %  LOW                             Platelet                  222 x10(3)/mcL                             MCV                       95.1 fL  HI                             MCH                       26.4 pg  LOW                             MCHC                      27.7 gm/dL  LOW                             RDW                       14.2 %                             MPV                       9.6 fL                             Abs Neut                  5.11 x10(3)/mcL                             Neutro Auto               60 %  NA                             Lymph Auto                27 %  NA                             Mono Auto                 11 %  NA                             Eos Auto                  2 %  NA                             Abs Eos                   0.1 x10(3)/mcL                             Basophil Auto             0 %  NA                             Abs Neutro                5.11 x10(3)/mcL                             Abs Lymph                 2.3 x10(3)/mcL                             Abs Mono                  0.9 x10(3)/mcL                             Abs Baso                  0.0 x10(3)/mcL                             IG%                       0.700 %  HI                             IG#                       0.0600 %  HI  , Lab results : Lab View   7/31/2019 11:40 CDT      UA Appear                 Clear                             UA Color                  Yellow                             UA Spec Grav              1.015                             UA Bili                   Negative                             UA pH                     5.5                             UA Urobilinogen           0.2                             UA Blood                  Negative                             UA Glucose                Negative                             UA Ketones                Negative                             UA Protein                Negative                             UA Nitrite                 Negative                             UA Leuk Est               Negative                             UA WBC                    0-2 /HPF                             UA RBC                    None Seen                             UA Bacteria               None Seen                             UA Squam Epithelial       None Seen    7/31/2019 11:31 CDT      Sample ABG                arterial                             Treatment                 28% T- collar                             Site                      Brachial Lt                             pH Art                    7.235  CRIT                             pCO2 Art                  116.2 mmHg  CRIT                             HCO3 Art                  49.2 mmol/L  HI                             CO2 Totl Art              >50.0 mmol/L  HI                             O2 Sat Art                96.0 %                             D base                    22.0  HI                             Allens                    N/A  .    Radiology results:             * Final Report *    Reason For Exam  Congestion    Radiology Report  HISTORY: Congestion.     EXAM: XR Chest 1 View.      PRIOR STUDY: Chest radiograph 7/21/2018.     FINDINGS: Radiographic examination of the chest in a single view  demonstrates unchanged position of the tracheostomy tube. The right  upper extremity PICC has been removed. The trace right effusion  remains. There is no pneumothorax. The cardiac silhouette is normal.  There is no acute osseous abnormality.     IMPRESSION: Unchanged trace right pleural effusion.       Signature Line  Electronically Signed By: Zackery OCHOA, Alok VASQUEZ  Date/Time Signed: 07/31/2019 11:48  .      Impression and Plan   Diagnosis   Weakness (TWY73-GK R53.1)   Atrial fibrillation with RVR (LWY09-XQ I48.91)   Hypoglycemia (BLI03-KF E16.2)   Acid-base imbalance (TFP62-NA E87.4)   Acute on chronic respiratory failure (YQC70-OY J96.20)   Plan   Disposition: Admit time  7/31/2019  14:00:00, Place in Observation Unit, Dione OCHOA, Geo MARTINEZ    Counseled: Patient, Family, Regarding diagnosis, Regarding diagnostic results, Regarding treatment plan, Patient indicated understanding of instructions.

## 2022-04-29 NOTE — H&P
Patient:   Ashia Castillo            MRN: [id number]            FIN: [id number]               Age:   69 years     Sex:  Female     :  1950   Associated Diagnoses:   None   Author:   Geo Parham MD      Basic Information   69-year-old morbidly obese female admitted with acute on chronic hypercapnic respiratory failure requiring BiPAP (with back-up rate due to sleep apnea overnight) through her trach.  Sputum positive for Pseudomonas.  Labile type 2 diabetes.  Patient also has morbid obesity and poor effort to get well.  Transferred to swing bed for ongoing medical care and rehab.  Her talking trach was replaced with a cuff trach to allow for BiPAP/vent.  Sputum culture shows multidrug resistant Pseudomonas.  Cardiac monitor shows A. fib and flutter.  ABG shows a pH of 7.4, PCO2 of 71, PO2 of 70 on BiPAP 15/8 with 45 FiO2.      Chief Complaint   Hypercapnic respiratory failure      Review of Systems   Unable to obtain as patient has known talking trach      Health Status   Allergies:    Allergic Reactions (Selected)  No Known Allergies,    Allergies (1) Active Reaction  No Known Allergies None Documented     Current medications:  (Selected)   Inpatient Medications  Ordered  Brovana: 2 mL, 15 mcg =, form: Soln-Inh, NEB, BID, first dose 19 12:58:00 CDT  Colace 100 mg oral capsule: 100 mg, form: Cap, Oral, BID, first dose 19 21:00:00 CDT  Dextrose 50% and Water  (50 mL vial/syringe): 25 mL, 12.5 gm =, form: Injection, IV Push, As Directed PRN for blood glucose, Infuse over: 5 minute(s), first dose 19 16:27:00 CDT, Unconscious patient: Repeat as ordered per protocol.  Dextrose 50% and Water  (50 mL vial/syringe): 25 mL, 12.5 gm =, form: Injection, IV Push, Once PRN for blood glucose, Infuse over: 5 minute(s), first dose 19 16:27:00 CDT, Unconscious patient: Look for other source of altered mental status.  Dextrose 50% and Water  (50 mL vial/syringe): 25 mL, IV Push, As Directed  PRN for blood glucose, Infuse over: 5 minute(s), first dose 08/04/19 9:46:00 CDT, Unconscious patient: Repeat as ordered per protocol.  Dextrose 50% and Water  (50 mL vial/syringe): 25 mL, IV Push, Once PRN for blood glucose, Infuse over: 5 minute(s), first dose 08/04/19 9:46:00 CDT, Unconscious patient: Look for other source of altered mental status.  Dextrose 50% and Water  (50 mL vial/syringe): 50 mL, 25 gm =, form: Injection, IV Push, As Directed PRN for blood glucose, Infuse over: 5 minute(s), first dose 07/31/19 16:27:00 CDT, Unconscious patient: Repeat as ordered per protocol.  Dextrose 50% and Water  (50 mL vial/syringe): 50 mL, IV Push, As Directed PRN for blood glucose, Infuse over: 5 minute(s), first dose 08/04/19 9:46:00 CDT, Unconscious patient: Repeat as ordered per protocol.  Dextrose 50% in Water intravenous solution: 25 mL, 12.5 gm =, form: Injection, IV Push, As Directed PRN for blood glucose, Infuse over: 5 minute(s), first dose 07/31/19 16:27:00 CDT, Conscious patient.  Dextrose 50% in Water intravenous solution: 25 mL, IV Push, As Directed PRN for blood glucose, Infuse over: 5 minute(s), first dose 08/04/19 9:46:00 CDT, Conscious patient.  Dulcolax Laxative 10 mg RECTAL suppository: 10 mg, form: Supp, UT (rectal), Every other day PRN for constipation, first dose 08/04/19 9:47:00 CDT  DuoNeb 0.5 mg-2.5 mg/3 mL inhalation solution: 3 mL, form: Soln, NEB, q4hr PRN for wheezing, first dose 07/31/19 14:45:00 CDT  Eliquis 5 mg oral tablet: 5 mg, form: Tab, Oral, BID, first dose 07/31/19 21:00:00 CDT  Januvia 50 mg oral tablet: 50 mg, form: Tab, Oral, Daily, first dose 08/03/19 9:00:00 CDT  KCL 20 mEq Oral Tab: 20 mEq, form: Tab-ER, Oral, BID, first dose 07/31/19 21:00:00 CDT  Lasix: 40 mg, form: Tab, Oral, Daily, first dose 08/04/19 10:00:00 CDT  Megace 40 mg/mL oral suspension: 400 mg, form: Susp, Oral, BID, first dose 08/02/19 12:47:00 CDT  Merrem 1 gm/ mL: 1 gm, form: Injection, IV Piggyback,  q8hr, Infuse over: 1 hr, first dose 08/04/19 10:12:00 CDT  Normal Saline Flush 0.9% injectable solution: 10 mL, form: Injection, IV Push, As Directed PRN for other (see comment), first dose 08/04/19 9:46:00 CDT, Prior to administering IV meds through Midline.  Normal Saline Flush 0.9% injectable solution: 10 mL, form: Injection, IV Push, q12hr, first dose 08/04/19 10:00:00 CDT, Flush each Midline IV lumen following hospital policy for other flushing guidelines  Normal Saline Flush 0.9% injectable solution: 20 mL, form: Injection, IV Push, As Directed PRN for other (see comment), first dose 08/04/19 9:46:00 CDT, Following administrations of IV meds through Midline  Norvasc 5 mg oral tablet: 5 mg, form: Tab, Oral, Daily, first dose 08/01/19 9:00:00 CDT  Protonix 20 mg ORAL enteric coated tablet: 20 mg, form: Tab-EC, Oral, Daily, first dose 08/01/19 6:00:00 CDT  Spiriva Respimat 2.5 mcg/inh inhalation aerosol: 2 inh, 5 mcg =, form: Aerosol, INH, Daily, first dose 08/01/19 12:58:00 CDT  acetaZOLAMIDE: 250 mg, form: Tab, Oral, BID, first dose 08/04/19 21:00:00 CDT  acetylcysteine 20% inhalation solution: 200 mg, form: Soln, NEB, q4hr Resp PRN for shortness of breath or wheezing, first dose 07/31/19 14:44:00 CDT  atorvastatin 40 mg oral tablet: 40 mg, form: Tab, Oral, Daily, first dose 07/31/19 17:00:00 CDT  glucagon: 1 mg, form: Injection, IM, q10min PRN for blood glucose, first dose 07/31/19 16:27:00 CDT, Conscious Patient with NO IV access available and BG < 45 mg/dl.  glucagon: 1 mg, form: Injection, IM, q10min PRN for blood glucose, first dose 07/31/19 16:27:00 CDT, Unconscious patient: Patient with NO IV access available and BG < 70 mg/dl.  glucagon: 1 mg, form: Injection, IM, q10min PRN for blood glucose, first dose 08/04/19 9:46:00 CDT, Conscious Patient with NO IV access available and BG < 45 mg/dl.  glucagon: 1 mg, form: Injection, IM, q10min PRN for blood glucose, first dose 08/04/19 9:46:00 CDT, Unconscious  patient: Patient with NO IV access available and BG < 70 mg/dl.  insulin lispro: 2-14 units, form: Injection, Subcutaneous, As Directed PRN for blood glucose, first dose 07/31/19 16:27:00 CDT  insulin lispro: 2-14 units, form: Injection, Subcutaneous, As Directed PRN for blood glucose, first dose 08/04/19 9:46:00 CDT  lactulose 10 g/15 mL oral syrup: 30 mL, 20 gm =, form: Syrup, Oral, Every other day PRN for as needed for constipation, first dose 08/04/19 9:47:00 CDT  levothyroxine 25 mcg (0.025 mg) oral tablet: 0.075 mg, form: Tab, Oral, Daily, first dose 08/01/19 6:00:00 CDT  metoprolol tartrate 25 mg oral tab: 25 mg, form: Tab, Oral, BID, first dose 07/31/19 21:00:00 CDT  mupirocin 2% topical oint: 1 madhav, form: Ointment, TOP, BID, first dose 08/01/19 21:00:00 CDT, apply to both temporal areas with cover dressing  senna 8.6 mg oral tablet: 2 tab(s), 17.2 mg =, form: Tab, Oral, Once PRN for constipation, first dose 08/04/19 9:09:00 CDT  tobramycin 60 mg/mL inhalation solution: 300 mg, form: Soln-Inh, NEB, BID, first dose 08/02/19 9:00:00 CDT, stop date 08/23/19 6:59:00 CDT  zinc oxide 40% topical ointment: 1 madhav, form: Ointment, TOP, TID, first dose 08/01/19 14:00:00 CDT, To right buttocks, both sides of groin, and under both breasts.  Documented Medications  Documented  Brovana 15 mcg/2 mL inhalation solution: 15 mcg = 2 mL, NEB, BID, 0 Refill(s)  DuoNeb 0.5 mg-2.5 mg/3 mL inhalation solution: 3 mL, NEB, q4hr, PRN PRN wheezing, 0 Refill(s)  Eliquis 5 mg oral tablet: 5 mg = 1 tab(s), Oral, BID, 0 Refill(s)  Januvia 50 mg oral tablet: 50 mg = 1 tab(s), Oral, Daily, 0 Refill(s)  KCL 20 mEq Oral Tab: 20 mEq = 1 tab(s), Oral, BID, 0 Refill(s)  Lasix 40 mg oral tablet: 40 mg = 1 tab(s), Oral, BID, 0 Refill(s)  Megace 40 mg/mL oral suspension: 400 mg = 10 mL, Oral, BID, 0 Refill(s)  Norvasc 5 mg oral tablet: 5 mg = 1 tab(s), Oral, Daily, 0 Refill(s)  Pantoprazole 40 mg ORAL EC-Tablet: 40 mg = 1 tab(s), Oral, Daily, 0  Refill(s)  Spiriva Respimat 2.5 mcg/inh inhalation aerosol: INH, Daily, 0 Refill(s)  acetaZOLAMIDE 250 mg oral tablet: 250 mg = 1 tab(s), Oral, BID, 0 Refill(s)  acetylcysteine 20% inhalation solution: 200 mg = 1 mL, NEB, q4hr Resp, PRN PRN shortness of breath or wheezing, 0 Refill(s)  atorvastatin 40 mg oral tablet: 40 mg = 1 tab(s), Oral, Daily, 0 Refill(s)  levothyroxine 75 mcg (0.075 mg) oral tablet: Oral, Daily, 0 Refill(s)  meropenem: 1 gm, IV Piggyback, q8hr, 0 Refill(s)  metoprolol tartrate 25 mg oral tab: 25 mg = 1 tab(s), Oral, BID, 0 Refill(s)  mupirocin 2% topical oint: 1 madhav, TOP, BID, 0 Refill(s)  senna 8.6 mg oral tablet: 17.2 mg = 2 tab(s), Oral, Once, PRN PRN constipation, 0 Refill(s)  tobramycin 60 mg/mL inhalation solution: 300 mg = 5 mL, NEB, BID, 0 Refill(s)  zinc oxide 40% topical ointment: 1 madhav, TOP, TID, 0 Refill(s)   Problem list:    All Problems  Morbid obesity / SNOMED CT 102449273 / Confirmed  Pressure ulcer of buttock stage 2 / SNOMED CT 4965958802 / Confirmed  Weakness - general / SNOMED CT 727382859 / Confirmed  Cor pulmonale / SNOMED CT 619746949 / Confirmed  Constipation / SNOMED CT 60528391 / Confirmed  Pseudomonas / SNOMED CT 10652678 / Confirmed  Atrial fibrillation and flutter / SNOMED CT 068454461 / Confirmed  Hypothyroid / SNOMED CT 326518998 / Confirmed  Hyperlipidemia / SNOMED CT 42318469 / Confirmed  Obesity / SNOMED CT 2953911009 / Confirmed  CAD - Coronary artery disease / SNOMED CT 9948811526 / Confirmed  Hypertension / SNOMED CT 44745923 / Confirmed  Achalasia / SNOMED CT 10927968 / Confirmed  Gastric ulcer / SNOMED CT 6339805654 / Confirmed  Pleural effusion / SNOMED CT [id number] / Confirmed  Anemia of chronic illness / SNOMED CT [id number] / Confirmed  Atrial fibrillation / SNOMED CT [id number] / Confirmed  Sepsis / SNOMED CT 705330498 / Confirmed  Respiratory failure, acute / SNOMED CT  [id number] / Confirmed  CAD (coronary atherosclerotic disease) / SNOMED CT [id number] / Confirmed  PAD (peripheral artery disease) / SNOMED CT [id number] / Confirmed  HLD (hyperlipidemia) / SNOMED CT [id number] / Confirmed  Osteoarthritis / SNOMED CT [id number] / Confirmed  Acute disease or injury-related malnutrition / SNOMED CT 359811401812878 / Confirmed  Diabetes / SNOMED CT [id number] / Confirmed  Obesity / SNOMED CT 7498553085 / Probable,    Active Problems (26)  Achalasia   Acute disease or injury-related malnutrition   Anemia of chronic illness   Atrial fibrillation   Atrial fibrillation and flutter   CAD (coronary atherosclerotic disease)   CAD - Coronary artery disease   Constipation   Cor pulmonale   Diabetes   Gastric ulcer   HLD (hyperlipidemia)   Hyperlipidemia   Hypertension   Hypothyroid   Morbid obesity   Obesity   Obesity   Osteoarthritis   PAD (peripheral artery disease)   Pleural effusion   Pressure ulcer of buttock stage 2   Pseudomonas   Respiratory failure, acute   Sepsis   Weakness - general         Histories   Past Medical History:    Active  Hypertension (99037062)  Hyperlipidemia (11295669)  CAD - Coronary artery disease (4664819244)  Obesity (2154589378)  Hypothyroid (315727017)  Atrial fibrillation and flutter (609042783)   Family History:    Heart failure.  Father     Procedure history:    Esophagogastroduodenoscopy on 7/10/2018 at 68 Years.  Comments:  7/10/2018 12:44 Marti Steve RN  auto-populated from documented surgical case  Sclerotherapy on 7/10/2018 at 68 Years.  Comments:  7/10/2018 12:44 Marti Steve RN  auto-populated from documented surgical case  Esophagogastroduodenoscopy on 7/6/2018 at 68 Years.  Comments:  7/6/2018 19:08 Munira Gentile RN  auto-populated from documented surgical  case  Biopsy Gastrointestinal on 7/6/2018 at 68 Years.  Comments:  7/6/2018 19:08 CDT - Otto RN, Munira SERRANO  auto-populated from documented surgical case  PEG Tube Insertion Initial on 6/20/2017 at 67 Years.  Comments:  6/20/2017 15:47 BINGT - Mary Anne SMILEY, Michael FROST  auto-populated from documented surgical case  Tracheostomy on 6/5/2017 at 67 Years.  Comments:  6/5/2017 13:13 BINGT - Andres SMILEY, Neo HUBBARD  auto-populated from documented surgical case  Bronchoscopy, Diagnostic on 5/23/2017 at 67 Years.  Comments:  5/23/2017 11:47 CDT - Aron RRT, Yadira VASQUEZ  auto-populated from documented surgical case  Bypass CABG (.) on 5/16/2017 at 67 Years.  Comments:  5/16/2017 10:22 ETHAN Saavedra RN, Warner  auto-populated from documented surgical case  Esophageal Motility on 1/31/2017 at 67 Years.  Comments:  1/31/2017 10:01 MENDY Hart RN, Ashley Velsaquez  auto-populated from documented surgical case  tubal ligation on 1/31/1979 at 29 Years.  tonsillectomy.  Laparoscopic biopsy of liver ([id number]).  Tracheostomy (25566131).  Thoracentesis with insertion of pleural tube ([id number]).  hip surgery with screws.   Social History        Social & Psychosocial Habits    Alcohol  01/06/2018  Use: Never    Tobacco  01/06/2018  Use: Never smoker    07/31/2019  Use: Never (less than 100 in l    Patient Wants Consult For Cessation Counseling No    Abuse/Neglect  07/31/2019  SHX Any signs of abuse or neglect No  .        Physical Examination   Vital Signs   8/4/2019 8:00 CDT        Peripheral Pulse Rate     68 bpm                             Respiratory Rate          24 br/min                             FIO2                      28 %                             SpO2                      99 %                             Oxygen Therapy            Trach collar                             Oxygen Flow Rate          2 L/min    8/4/2019 7:21 CDT        Temperature Oral          37.3 DegC                             Temperature Oral  (calculated)             99.14 DegF                             Peripheral Pulse Rate     67 bpm                             SpO2                      100 %                             Systolic Blood Pressure   117 mmHg                             Diastolic Blood Pressure  62 mmHg                             Mean Arterial Pressure, Cuff              80 mmHg    8/4/2019 7:00 CDT        FIO2                      28 %                             Oxygen Therapy            Trach collar                             Oxygen Flow Rate          2 L/min    8/4/2019 2:12 CDT        Temperature Oral          37 DegC                             Temperature Oral (calculated)             98.60 DegF                             Peripheral Pulse Rate     132 bpm  HI                             SpO2                      100 %                             Systolic Blood Pressure   118 mmHg                             Diastolic Blood Pressure  70 mmHg                             Mean Arterial Pressure, Cuff              88 mmHg    8/3/2019 22:50 CDT       Temperature Oral          36.5 DegC                             Temperature Oral (calculated)             97.70 DegF                             Peripheral Pulse Rate     90 bpm                             SpO2                      97 %                             Systolic Blood Pressure   128 mmHg                             Diastolic Blood Pressure  78 mmHg                             Mean Arterial Pressure, Cuff              94 mmHg    8/3/2019 21:00 CDT       Peripheral Pulse Rate     66 bpm                             Peripheral Pulse Rate     67 bpm                             Respiratory Rate          20 br/min                             FIO2                      28 %                             SpO2                      100 %                             Oxygen Therapy            Trach collar                             Oxygen Flow Rate          2 L/min    8/3/2019 19:34 CDT        Temperature Oral          36.8 DegC                             Temperature Oral (calculated)             98.24 DegF                             Peripheral Pulse Rate     66 bpm                             SpO2                      96 %                             Systolic Blood Pressure   105 mmHg                             Diastolic Blood Pressure  61 mmHg                             Mean Arterial Pressure, Cuff              76 mmHg    8/3/2019 19:00 CDT       Oxygen Therapy            Trach collar    8/3/2019 13:24 CDT       Oxygen Flow Rate          2 L/min    8/3/2019 12:59 CDT       24 HR Intake Totals       590 mL                             24 HR Output Totals       0 mL                             24 HR I&O Balance         590 mL    8/3/2019 11:59 CDT       24 HR Intake Totals       110 mL                             24 HR Output Totals       0 mL                             24 HR I&O Balance         110 mL    8/3/2019 11:00 CDT       Temperature Oral          37.1 DegC                             Temperature Oral (calculated)             98.78 DegF                             Peripheral Pulse Rate     110 bpm  HI                             SpO2                      99 %                             Systolic Blood Pressure   114 mmHg                             Diastolic Blood Pressure  77 mmHg                             Mean Arterial Pressure, Cuff              89 mmHg    8/3/2019 8:59 CDT        24 HR Intake Totals       240 mL                             24 HR Output Totals       0 mL                             24 HR I&O Balance         240 mL    8/3/2019 8:00 CDT        Oxygen Therapy            Trach collar                             Oxygen Flow Rate          2 L/min    8/3/2019 7:24 CDT        Temperature Oral          37.3 DegC                             Temperature Oral (calculated)             99.14 DegF                             Peripheral Pulse Rate     128 bpm  HI                              SpO2                      100 %                             Systolic Blood Pressure   119 mmHg                             Diastolic Blood Pressure  67 mmHg                             Mean Arterial Pressure, Cuff              86 mmHg    8/3/2019 7:00 CDT        Peripheral Pulse Rate     76 bpm                             Respiratory Rate          20 br/min                             FIO2                      28 %                             SpO2                      96 %                             Oxygen Therapy            Trach collar                             Oxygen Flow Rate          2 L/min    8/3/2019 6:59 CDT        24 HR Intake Totals       920 mL                             24 HR Output Totals       0 mL                             24 HR I&O Balance         920 mL    8/3/2019 4:59 CDT        24 HR Intake Totals       920 mL                             24 HR Output Totals       0 mL                             24 HR I&O Balance         920 mL    8/3/2019 4:00 CDT        Heart Rate Monitored      67 bpm    8/3/2019 2:59 CDT        24 HR Intake Totals       920 mL                             24 HR Output Totals       0 mL                             24 HR I&O Balance         920 mL    8/3/2019 2:00 CDT        Temperature Oral          36.8 DegC                             Temperature Oral (calculated)             98.24 DegF                             Peripheral Pulse Rate     69 bpm                             SpO2                      100 %                             Systolic Blood Pressure   108 mmHg                             Diastolic Blood Pressure  72 mmHg                             Mean Arterial Pressure, Cuff              84 mmHg    8/3/2019 1:00 CDT        Peripheral Pulse Rate     68 bpm                             FIO2                      30 %                             SpO2                      99 %    8/3/2019 0:59 CDT        24 HR Intake Totals       920 mL                              24 HR Output Totals       0 mL                             24 HR I&O Balance         920 mL    8/3/2019 0:00 CDT        Heart Rate Monitored      66 bpm        No qualifying data available     General: Morbid obesity  Eye: PERRLA, EOMI, clear conjunctiva, eyelids normal  HENT: Normal cephalic atraumatic  Neck: Tracheal site looks okay without active bleeding  Respiratory: Poor airflow throughout all fields and rare scattered rhonchi  Cardiovascular: Irregularly irregular  Gastrointestinal: soft, non-tender, non-distended with normal bowel sounds, without masses to palpation  Integumentary: Stage II sacral decubitus pressure ulcer present on admission.  Bi-temporal skin lesions likely due to cancer  Neurologic: cranial nerves grossly intact, no signs of peripheral neurological deficit  Psych: Unable to assess      Impression and Plan     Acute on chronic hypercapnic respiratory failure  -Possibly exacerbated by Pseudomonas pulmonary infection  -Started on IV cefepime but due to intermediate resistance will change patient to Merrem starting 8/4/2019 and plan to treat for a minimum of 10 days  -Started on inhaled tobramycin with plans to complete at least 21 days.  -BiPAP via trach as needed with backup rate overnight as pt certainly has bad ISIDRA as well.  Increase CPAP from 8-10.  -Start Acetazolamide 250 mg twice daily  -Oxygen as needed for hypoxia  -Duo nebs  -P.o. prednisone  -Pulmonary consult completed and they have added Brovana and Brandyniva  -Will plan to discuss with Dr. Ulrich this week if patient may benefit from increased trach size  -PRN Ativan for agitation while on vent    Pseudomonas pulmonary infection  -Culture was obtained after new trach was placed so I do not believe it is colonization  -Inhaled tobramycin for a minimum of 21-days  -Merrem for a minimum of 10 days (will finish on 8/14/2019)    Highly suspect obstructive sleep apnea and likely degree of pickwickian syndrome  -Would not recommend  removing trach at this time  -Place back-up rate on BiPAP overnight.    Diabetes mellitus type 2   -Stable on current meds  -CBGs with insulin sliding scale  -A1C = 6.9    Constipation  -2 tablets senna today and monitor for effect    Cor pulmonale with fluid retention  -Increase Lasix to 40 mg twice daily  -Chemistries in a.m.    Atrial fibrillation / atrial flutter with rapid ventricular rate at times  -Improved at present  -IV Lopressor as needed tachycardia  -Continue Eliquis    Generalized weakness and debilitated state  -PT and OT evaluation and treatment  -Assess and improve nutritional state    Malnutrition  -Dietitian consult for improved assessment and nutritional recommendations  -On Megace  -Calorie count    Essential hypertension  -Stable continue home medications    Small right pleural effusion  -Appears to small to be worth thoracentesis    Chronic anemia  -Stable    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 fib  CODE STATUS is full code

## 2022-05-02 NOTE — HISTORICAL [facility]ERNER
This is a historical note converted from [employer]. Formatting and pictures may have been removed.  Please reference Cer[employer]issa for original formatting and attached multimedia. Admit and Discharge Dates  Admit Date: 08/04/2019  Discharge Date: 08/23/2019  Physicians  Attending Physician - Dione OCHOA, Geo MALDONADO  Attending Physician - Mitra OCHOA, Laurie  Admitting Physician - Dione OCHOA, Geo MALDONADO  Consulting Physician - Serg Damon MD, Leo VELAZQUEZ  Primary Care Physician - Jo-Ann Solorzano  Discharge Diagnosis  Atrial fibrillation?I48.91  Chronic respiratory failure with hypercapnia?J96.12  Hypothyroid?E03.9  Pleural effusion?J90  Shortness of breath?R06.02  Tracheostomy present?Z93.0  Surgical Procedures  No procedures recorded for this visit.  Immunizations  No immunizations recorded for this visit.  Admission Information  Chief Complaint?  ??Hypercapnic respiratory failure?  ?  History of Present Illness?  ?????69-year-old morbidly obese female admitted with acute on chronic hypercapnic respiratory failure 08/04?requiring BiPAP (with back-up rate due to sleep apnea overnight) through her trach. ?Sputum positive for Pseudomonas.??  ??  Hospital Course  Pt was admitted to acute care, needed prolonged abx for treatment of her pseudomonas pneumonia, and PT/OT due to debility.? Dr. Ulrich was consulted for pulmonary recommendations.? Pt has improved and has been changed to a trillogy machine for use at home.? Home health has been ordered and Artemio has arranged for the Trillogy at home.? Pt tolerated the trillogy x 2 nights here and is ready for d/c.  Time Spent on discharge  40 min  Objective  Vitals & Measurements  T:?36.7? ?C (Oral)? TMIN:?36.7? ?C (Oral)? TMAX:?37.2? ?C (Oral)? HR:?118(Peripheral)? RR:?20? BP:?125/83? SpO2:?97%? BMI:?29.73?  Physical Exam  General:? obese  ?????Neck: Tracheal site looks okay?few thin secretions,less rattling today,?needing frequent suctioning,?secretions are thin and  clear  ?????Respiratory: Clear to auscultation bilaterally good breath sounds and equal  ?????Cardiovascular: Irregularly irregular, trace bilateral lower extremity edema  ?????Gastrointestinal: soft, non-tender, non-distended with normal bowel sounds, without masses to palpation  ?????Integumentary: Stage II sacral decubitus pressure ulcer present on admission. ?Bi-temporal skin lesions likely due to cancer  Patient Discharge Condition  stable  Discharge Disposition  stable   Discharge Medication Reconciliation  Prescribed  acetaZOLAMIDE (acetaZOLAMIDE 250 mg oral tablet)?250 mg, Oral, BID  acetaminophen (Tylenol 325 mg oral tablet)?650 mg, Oral, q4hr, PRN pain  albuterol-ipratropium (DuoNeb 0.5 mg-2.5 mg/3 mL inhalation solution)?3 mL, NEB, q4hr, PRN wheezing  apixaban (Eliquis 5 mg oral tablet)?5 mg, Oral, BID  arformoterol (Brovana 15 mcg/2 mL inhalation solution)?15 mcg, NEB, BID  atorvastatin (atorvastatin 40 mg oral tablet)?40 mg, Oral, Daily  bisacodyl (Dulcolax Laxative 10 mg RECTAL suppository)?10 mg, MS (rectal), Every other day, PRN constipation  bisacodyl (Dulcolax Laxative 10 mg RECTAL suppository)?10 mg, MS (rectal), Daily, PRN constipation  citalopram (Celexa 10 mg oral tablet)?10 mg, Oral, Daily  diclofenac topical (Voltaren 1% topical gel)?2 gm, TOP, QID, PRN pain  docusate (Colace 100 mg oral capsule)?200 mg, Oral, BID  furosemide (Lasix 40 mg oral tablet)?40 mg, Oral, BID  guaiFENesin (Mucinex 600 mg oral tablet, extended release)?1,200 mg, Oral, BID  insulin glargine (insulin glargine 100 units/mL subcutaneous solution)?10 units, Subcutaneous, Once a day (at bedtime)  levothyroxine (levothyroxine 75 mcg (0.075 mg) oral tablet)?75 mcg, Oral, Daily  megestrol (megestrol 40 mg/mL oral suspension)?800 mg, Oral, Daily  metOLazone (metOLazone 5 mg oral tablet)?5 mg, Oral, Daily  metoprolol (metoprolol tartrate 25 mg oral tab)?50 mg, Oral, BID  multivitamin with iron (Icar-C Plus oral tablet)?1 mg,  Oral, Daily  pantoprazole (Pantoprazole 40 mg ORAL EC-Tablet)?40 mg, Oral, BID  polyethylene glycol 3350 (MiraLax oral powder for reconstitution)?17 gm, Oral, Daily  potassium chloride (potassium chloride 20 mEq oral TABLET extended release)?20 mEq, Oral, BID  sitaGLIPtin (Januvia 50 mg oral tablet)?50 mg, Oral, Daily  tamsulosin (Flomax 0.4 mg oral capsule)?0.4 mg, Oral, BID  tiotropium (Spiriva Respimat 2.5 mcg/inh inhalation aerosol)?1 ampule, INH, Daily  zinc oxide topical (zinc oxide 40% topical ointment)?1 madhav, TOP, TID, PRN itching  Continue  zinc oxide topical (zinc oxide 40% topical ointment)?1 madhav, TOP, TID  Discontinue  acetylcysteine (acetylcysteine 20% inhalation solution)?200 mg, NEB, q4hr Resp, PRN shortness of breath or wheezing  amLODIPine (Norvasc 5 mg oral tablet)?5 mg, Oral, Daily  meropenem?1 gm, IV Piggyback, q8hr  metoprolol (metoprolol tartrate 25 mg oral tab)?25 mg, Oral, BID  mupirocin topical (mupirocin 2% topical oint)?1 madhav, TOP, BID  pantoprazole (Pantoprazole 40 mg ORAL EC-Tablet)?40 mg, Oral, Daily  potassium chloride (KCL 20 mEq Oral Tab)?20 mEq, Oral, BID  senna (senna 8.6 mg oral tablet)?17.2 mg, Oral, Once, PRN constipation  tobramycin (tobramycin 60 mg/mL inhalation solution)?300 mg, NEB, BID  Education and Orders Provided  Tracheostomy, Care After  Atrial Fibrillation  Constipation, Adult  Anemia  Preventing Pressure Injuries  Deconditioning  Pressure Injury  Acute Respiratory Distress Syndrome, Adult  Diabetes Mellitus and Standards of Medical Care  Discharge - 08/23/19 8:51:00 CDT, Home, Give all scheduled vaccinations prior to discharge.?  Discharge Activity - Activity as Tolerated?  Discharge Diet - Cardiac?  Follow up  Sandra Espana MD, on 09/27/2019  ????be there by 1:45, bring ID, INS card, medication list,  Hilton Collado, within 1 week  ????lm on Doctors  for appoinment ?8/22/19 1254 gb  [facility] will see patient after discharge PHONE # [phone]  [facility]  Trilogy at patient bedside PHONE # [phone]

## 2022-05-02 NOTE — HISTORICAL [facility]
This is a historical note converted from [employer]. Formatting and pictures may have been removed.  Please reference Cercharissa for original formatting and attached multimedia. Admit and Discharge Dates  Admit Date: 09/12/2019  Discharge Date: 09/26/2019  Physicians  Attending Physician - Cristian OCHOA, Radhika  Attending Physician - Cristian OCHOA, Radhika  Attending Physician - Cristian OCHOA, Radhika  Attending Physician - Mitra OCHOA, Laurie  Admitting Physician - Cristian OCHOA, Radhika  Consulting Physician - Serg Damon MD, Zuhair  Primary Care Physician - Jo-Ann Solorzano  Discharge Diagnosis  Atrial fibrillation?I48.91  Chronic respiratory failure with hypercapnia?J96.12  Hypothyroid?E03.9  Pleural effusion?J90  Shortness of breath?R06.02  Tracheostomy present?Z93.0  Atrial fibrillation?I48.91  ?   Chronic respiratory failure with hypercapnia?J96.12  ?   Hypothyroid?E03.9  ?   Pleural effusion?J90  ?   Shortness of breath?R06.02  ?  Tracheostomy present?Z93.0  ?  Orders:  ?Influenza Virus Vaccine, Inactivated, 0.5 mL, form: Susp, IM, Daily, Order duration: 1 day(s), first dose 09/25/19 9:00:00 CDT, stop date 09/26/19 8:59:00 CDT  ?pneumococcal 23-polyvalent vaccine, 0.5 mL, form: Injection, IM, Once-NOW, first dose 09/25/19 10:22:00 CDT, stop date 09/25/19 10:22:00 CDT, Waste Code BKC  ?  Pseudomonas A tracheiitis  ?  Lethargy/somnolence?due to hypercapnea/acute on chronic respiratory failure  ?  Mild left upper extremity edema  ?  Major depression  ?  Acute kidney injury on chronic kidney disease  ?  DM type II?  ??  Chronic hypercapnic respiratory failure (and Highly suspect obstructive sleep apnea and likely degree of pickwickian syndrome)  ?  Hypokalemia  ?  Atrial fibrillation / atrial flutter?  ?  Essential hypertension  ?  Acute on Chronic anemia  ?  Possible bilateral temporal skin cancer- probably basal cell or keratoacanthomas  ?  Morbid obesity  ?  Generalized weakness  ?  Compensated respiratory Acidosis with  ?metabolic alkalosis, expected CO2 in this case would be 27-30, she seems volume contracted .  Surgical Procedures  No procedures recorded for this visit.  Immunizations  09/25/2019 - influenza virus vaccine, inactivated?  09/25/2019 - influenza virus vaccine, inactivated?  09/25/2019 - pneumococcal 23-polyvalent vaccine?  Admission Information  Patient was admitted with weakness, increased shortness of breath, dizziness. ?She had a lengthy stay here prior to this admission related to Pseudomonas pulmonary infection and acute on chronic hypercapnic respiratory failure with marked deconditioning.?  ?   09/25 pt s/p 2U PRBCs yesterday, HGB 10 this am, pt SOB improving, tolerating nebs and treatments [1]  Significant Findings  pt was admitted with worsening acute on chornic respiratory fialure with worsening Reji on chronic?and uncontrolled DM, found to have pseudomonas on tracheal cultures.? She completed 7 days of iv abx, Merem.? She has been slowly improving and back to her baseline and ready for d/c home.? She has another 5 days of cipro to complete po abx.? She was started on Provigil and was given oxybutinin for incontinence.? She was also started on some effexxxor due to depression due to her chronic medical illness and frequent hospitalizations.  Time Spent on discharge  35 min  Objective  Vitals & Measurements  T:?37? ?C (Oral)? TMIN:?36.7? ?C (Oral)? TMAX:?37.3? ?C (Oral)? HR:?66(Peripheral)? RR:?20? BP:?122/81? SpO2:?98%?  Physical Exam  General: Awake, ALert  Respiratory: Clear to auscultation bilaterally. ?Trach site looks good.? trache is clean and dry  Cardiovascular: regular rate and rhythm without murmurs, gallops or rubs  Gastrointestinal: soft, non-tender, non-distended with normal bowel sounds, without masses to palpation  Psych: Depressed  Extremities: Slight left upper extremity edema PICC line site looks okay [2]  Patient Discharge Condition  stable  Discharge Disposition  home with home health    Discharge Medication Reconciliation  Prescribed  ciprofloxacin (Cipro 500 mg oral tablet)?500 mg, Oral, BID  docusate (docusate sodium 100 mg oral tablet)?100 mg, Oral, BID, PRN as needed for constipation  modafinil (Provigil 100 mg oral tablet)?100 mg, Oral, Daily  oxybutynin (oxybutynin 5 mg oral tablet)?5 mg, Oral, Daily  venlafaxine (Effexor XR 37.5 mg oral capsule, extended release)?75 mg, Oral, Daily  zinc oxide topical (zinc oxide 40% topical ointment)?1 madhav, TOP, TID  Continue  DULoxetine (Cymbalta 30 mg oral delayed release capsule)?60 mg, Oral, Daily  acetaZOLAMIDE (acetaZOLAMIDE 250 mg oral tablet)?250 mg, Oral, BID  acetylcysteine (acetylcysteine 20% inhalation solution)?0.8 gm, NEB, q6hr Resp  apixaban (Eliquis 5 mg oral tablet)?5 mg, Oral, BID  arformoterol (Brovana 15 mcg/2 mL inhalation solution)?15 mcg, NEB, BID  atorvastatin (atorvastatin 40 mg oral tablet)?40 mg, Oral, Daily  ipratropium (ipratropium 500 mcg/2.5 mL inhalation solution), NEB, q6hr  levothyroxine (levothyroxine 75 mcg (0.075 mg) oral tablet)?75 mcg, Oral, Daily  megestrol (megestrol 40 mg/mL oral suspension)?800 mg, Oral, Daily  metoprolol (metoprolol tartrate 25 mg oral tab)?50 mg, Oral, BID  pantoprazole (Pantoprazole 40 mg ORAL EC-Tablet)?40 mg, Oral, BID  pantoprazole (Pantoprazole 40 mg ORAL EC-Tablet)?40 mg, Oral, BID  pantoprazole (Pantoprazole 40 mg ORAL EC-Tablet)?40 mg, Oral, BID  pantoprazole (Pantoprazole 40 mg ORAL EC-Tablet)?40 mg, Oral, BID  potassium chloride (potassium chloride 20 mEq oral TABLET extended release)?20 mEq, Oral, BID  scopolamine (scopolamine 1.5 mg transdermal film, extended release)?1.5 mg, TD, q72hr  Discontinue  Dextrose 50% in Water intravenous solution?12.5 gm, IV Push, As Directed, PRN blood glucose  Dextrose 50% in Water intravenous solution?12.5 gm, IV Push, Once, PRN blood glucose  Dextrose 50% in Water intravenous solution?12.5 gm, IV Push, As Directed, PRN blood glucose  Dextrose 50% in  Water intravenous solution?25 gm, IV Push, As Directed, PRN blood glucose  Dextrose 50% in Water intravenous solution?12.5 gm, IV Push, As Directed, PRN blood glucose  Dextrose 50% in Water intravenous solution?12.5 gm, IV Push, Once, PRN blood glucose  Dextrose 50% in Water intravenous solution?12.5 gm, IV Push, As Directed, PRN blood glucose  Dextrose 50% in Water intravenous solution?25 gm, IV Push, As Directed, PRN blood glucose  acetaminophen (Tylenol 325 mg oral tablet)?650 mg, Oral, q4hr, PRN pain  acetaminophen (acetaminophen 325 mg oral tablet)?650 mg, Oral, q6hr, PRN fever  bisacodyl (Dulcolax Laxative 10 mg RECTAL suppository)?10 mg, NH (rectal), Every other day, PRN constipation  bisacodyl (Dulcolax Laxative 10 mg RECTAL suppository)?10 mg, NH (rectal), Daily, PRN constipation  bisacodyl (Dulcolax Laxative 10 mg RECTAL suppository)?10 mg, NH (rectal), Every other day, PRN constipation  bisacodyl (Dulcolax Laxative 10 mg RECTAL suppository)?10 mg, NH (rectal), Daily, PRN constipation  docusate (Colace 100 mg oral capsule)?200 mg, Oral, BID  glucagon (glucagon 1 mg injection)?1 mg, IM, q10min, PRN blood glucose  glucagon (glucagon 1 mg injection)?1 mg, IM, q10min, PRN blood glucose  glucagon (glucagon 1 mg injection)?1 mg, IM, q10min, PRN blood glucose  glucagon (glucagon 1 mg injection)?1 mg, IM, q10min, PRN blood glucose  insulin lispro (insulin lispro 100 units/mL injectable solution)  meropenem?1 gm, IV Piggyback, q12hr  ondansetron (Zofran 2 mg/mL injectable solution)?4 mg, IV Push, q4hr, PRN nausea  scopolamine (scopolamine 1.5 mg transdermal film, extended release)?1.5 mg, TD, q72hr  Education and Orders Provided  Hypertension  Coronary Artery Disease, Female  How to Clean a Tracheostomy Tube, Adult, Easy-to-Read  Diabetes Mellitus and Sick Day Management  Diabetes Mellitus and Standards of Medical Care  Pleural Effusion  Atrial Fibrillation  Fall Prevention in Hospitals, Adult  Fall Prevention and  Home Safety, Easy-to-Read (Custom)  Discharge - 09/26/19 10:08:00 CDT, Home, Give all scheduled vaccinations prior to discharge.?  Discharge Activity - Activity as Tolerated?  Discharge Diet - Bariatric Soft?  Follow up  Hilton Collado, within 2 to 4 weeks  ????tio with doctors office will call pt with appointment date and time. 9/25/19 1120am, gb  Leo Ulrich, on 10/02/2019  ????PLEASE GO TO: [address] [id number]  [facility] will see patient after discharge PHONE # [phone]     [1]?Progress/SOAP Note; Ashanti Santo MD 09/25/2019 13:36 CDT  [2]?Progress/SOAP Note; Ashanti Santo MD 09/25/2019 13:36 CDT